# Patient Record
Sex: FEMALE | Race: WHITE | NOT HISPANIC OR LATINO | Employment: OTHER | URBAN - METROPOLITAN AREA
[De-identification: names, ages, dates, MRNs, and addresses within clinical notes are randomized per-mention and may not be internally consistent; named-entity substitution may affect disease eponyms.]

---

## 2017-09-27 ENCOUNTER — ALLSCRIPTS OFFICE VISIT (OUTPATIENT)
Dept: OTHER | Facility: OTHER | Age: 67
End: 2017-09-27

## 2017-09-27 DIAGNOSIS — Z78.0 ASYMPTOMATIC MENOPAUSAL STATE: ICD-10-CM

## 2017-09-27 DIAGNOSIS — Z12.31 ENCOUNTER FOR SCREENING MAMMOGRAM FOR MALIGNANT NEOPLASM OF BREAST: ICD-10-CM

## 2017-10-31 ENCOUNTER — GENERIC CONVERSION - ENCOUNTER (OUTPATIENT)
Dept: OTHER | Facility: OTHER | Age: 67
End: 2017-10-31

## 2017-11-30 ENCOUNTER — HOSPITAL ENCOUNTER (OUTPATIENT)
Dept: BONE DENSITY | Facility: MEDICAL CENTER | Age: 67
Discharge: HOME | End: 2017-11-30
Payer: MEDICARE

## 2017-11-30 DIAGNOSIS — Z12.31 ENCOUNTER FOR SCREENING MAMMOGRAM FOR MALIGNANT NEOPLASM OF BREAST: ICD-10-CM

## 2017-11-30 DIAGNOSIS — Z78.0 ASYMPTOMATIC MENOPAUSAL STATE: ICD-10-CM

## 2017-11-30 PROCEDURE — 77080 DXA BONE DENSITY AXIAL: CPT

## 2017-12-04 ENCOUNTER — GENERIC CONVERSION - ENCOUNTER (OUTPATIENT)
Dept: OTHER | Facility: OTHER | Age: 67
End: 2017-12-04

## 2018-01-12 VITALS
HEIGHT: 58 IN | SYSTOLIC BLOOD PRESSURE: 124 MMHG | DIASTOLIC BLOOD PRESSURE: 78 MMHG | BODY MASS INDEX: 29.68 KG/M2 | WEIGHT: 141.38 LBS

## 2018-01-23 NOTE — MISCELLANEOUS
Message   Recorded as Task   Date: 12/04/2017 10:00 AM, Created By: eSrgio Gallardo   Task Name: Go to Result   Assigned To: [facility]   Regarding Patient: Johnie Plascencia, Status: Active   Comment:    Tamir Ordonez - 04 Dec 2017 10:00 AM     TASK CREATED  Please let Brooke Buchanan know she has osteopenia only  She should maximize her weight-bearing exercise, calcium and vitamin-D requirements  Patient informed  Active Problems    1  Asymptomatic age-related postmenopausal state (V49 81) (Z78 0)   2  Breast cancer screening, high risk patient (V76 11) (Z12 31)   3  Breast self examination education, encounter for (V65 49) (Z71 89)   4  Cervical cancer screening (V76 2) (Z12 4)   5  Encounter for routine gynecological examination (V72 31) (Z01 419)    Current Meds   1  Avalide 150-12 5 MG Oral Tablet (Irbesartan-Hydrochlorothiazide); Therapy: [id number] to (Last Rx:[id number])  Requested for: 95WHI0883 Ordered   2  Calcium TABS; Therapy: (Recorded:58Bly0258) to Recorded   3  Multi-Vitamin Daily Oral Tablet; Therapy: (Recorded:62Tuv1101) to Recorded   4  Simvastatin 40 MG Oral Tablet; Therapy: 30WRN5512 to (Last Rx:08Mar2011)  Requested for: 95NRO3283 Ordered    Allergies    1  No Known Drug Allergies    2  No Known Environmental Allergies   3   No Known Food Allergies    Signatures   Electronically signed by : Bhargav Jon, ; Dec  4 2017 11:45AM EST                       (Author)

## 2018-10-09 ENCOUNTER — OFFICE VISIT (OUTPATIENT)
Dept: OBGYN CLINIC | Facility: CLINIC | Age: 68
End: 2018-10-09
Payer: MEDICARE

## 2018-10-09 ENCOUNTER — TELEPHONE (OUTPATIENT)
Dept: OBGYN CLINIC | Facility: CLINIC | Age: 68
End: 2018-10-09

## 2018-10-09 VITALS
BODY MASS INDEX: 29.52 KG/M2 | SYSTOLIC BLOOD PRESSURE: 140 MMHG | DIASTOLIC BLOOD PRESSURE: 68 MMHG | WEIGHT: 140.6 LBS | HEIGHT: 58 IN

## 2018-10-09 DIAGNOSIS — N81.4 CYSTOCELE WITH UTERINE PROLAPSE: Primary | ICD-10-CM

## 2018-10-09 PROCEDURE — 99214 OFFICE O/P EST MOD 30 MIN: CPT | Performed by: OBSTETRICS & GYNECOLOGY

## 2018-10-09 RX ORDER — SIMVASTATIN 40 MG
1 TABLET ORAL DAILY
COMMUNITY
Start: 2011-03-08

## 2018-10-09 RX ORDER — LOSARTAN POTASSIUM AND HYDROCHLOROTHIAZIDE 25; 100 MG/1; MG/1
1 TABLET ORAL DAILY
COMMUNITY
Start: 2018-03-07 | End: 2019-03-07

## 2018-10-09 NOTE — PROGRESS NOTES
CC:  Possible growth of the vagina    HPI: Cuong Cortez presents for concerned that she may have a growth of the vaginal area  The patient states over the past 2 weeks she has noticed something in the vaginal area along with a sensation of fullness and pressure  She denies any bleeding, discharge, bladder or bowel issues  Past Medical History:  History reviewed  No pertinent past medical history  Past Surgical History:  History reviewed  No pertinent surgical history  Past OB/Gyn History:  Menstrual cycles     ALLERGIES: No Known Allergies    MEDS:   Current Outpatient Prescriptions:     CALCIUM PO    Cholecalciferol (VITAMIN D PO)    losartan-hydrochlorothiazide (HYZAAR) 100-25 MG per tablet    Multiple Vitamin (MULTI-VITAMIN DAILY PO)    simvastatin (ZOCOR) 40 mg tablet    Review of Systems:  Skin: No rashes or discolorations of any concern  RESP: Denies SOB, no cough  CV: Denies chest pain or palpitations  Breasts: Denies masses, pain, skin changes and nipple discharge  GI: Denies abdominal pain, heartburn, nausea, vomiting, changes in bowel habits  : Denies dysuria, frequency, CVA tenderness, incontinence and hematuria  Genitalia: Denies abnormal vaginal discharge, external lesions, rashes, pelvic pain, abnormal bleeding  Acknowledges the presence of  vaginal pressure  Rectal:  Denies pain, bleeding, hemorrhoids,    Physical Exam:  /68 (BP Location: Left arm, Patient Position: Sitting, Cuff Size: Large)   Ht 4' 10" (1 473 m)   Wt 63 8 kg (140 lb 9 6 oz)   BMI 29 39 kg/m²    Gen: The patient was alert and oriented x3, pleasant well-appearing female in no acute distress  Abd:  Soft, nontender, nondistended, no masses or organomegaly  Back:  No CVA tenderness, no tenderness to palpation along spine  Pelvic  Normal appearing external female genitalia, no visible lesions, no rashes  Vagina is free of discharge, normal vaginal epithelium, no abnormal  Lesions   There is evidence of anterior wall prolapse and some bulging posteriorly  With straining the cervix comes down to the hymenal ring  No palpable adnexal masses or tenderness  No anoperineal lesions  Skin:  No concerning lesions  Extremeties: No edema      Assessment & Plan:   1  Uterine prolapse with mild cystocele  I reviewed with Stefany Gonzales the fact that she has uterine prolapse of almost a third-degree along with a small cystocele and the various options available to her  We discussed non intervention, pessary, sacral spinous ligament fixation of the vagina only, vaginal hysterectomy along with sacral spinous ligament fixation  ACOG pamphlets with regard to these options were given to Stefany Gonzales would like to positive these at home and will get back to me with her decision  I have spent 27 minutes with Patient  today in which greater than 50% of this time was spent in counseling/coordination of care regarding Diagnostic results, Prognosis, Risks and benefits of tx options, Intructions for management and Patient and family education

## 2018-10-17 ENCOUNTER — TELEPHONE (OUTPATIENT)
Dept: OBGYN CLINIC | Facility: CLINIC | Age: 68
End: 2018-10-17

## 2018-10-17 NOTE — TELEPHONE ENCOUNTER
Called pt and several possible dates for surgery were given  Pt states she would look at schedules and call back by Monday to make a consult appointment to schedule surgery

## 2018-10-25 ENCOUNTER — OFFICE VISIT (OUTPATIENT)
Dept: OBGYN CLINIC | Facility: CLINIC | Age: 68
End: 2018-10-25
Payer: MEDICARE

## 2018-10-25 ENCOUNTER — PREP FOR PROCEDURE (OUTPATIENT)
Dept: OBGYN CLINIC | Facility: CLINIC | Age: 68
End: 2018-10-25

## 2018-10-25 VITALS — SYSTOLIC BLOOD PRESSURE: 120 MMHG | DIASTOLIC BLOOD PRESSURE: 80 MMHG | WEIGHT: 139 LBS | BODY MASS INDEX: 29.05 KG/M2

## 2018-10-25 DIAGNOSIS — N81.4 UTERINE PROLAPSE: Primary | ICD-10-CM

## 2018-10-25 DIAGNOSIS — N81.4 CYSTOCELE WITH UTERINE PROLAPSE: ICD-10-CM

## 2018-10-25 DIAGNOSIS — Z41.9 SURGERY, ELECTIVE: Primary | ICD-10-CM

## 2018-10-25 PROCEDURE — 1124F ACP DISCUSS-NO DSCNMKR DOCD: CPT | Performed by: PATHOLOGY

## 2018-10-25 PROCEDURE — 99214 OFFICE O/P EST MOD 30 MIN: CPT | Performed by: OBSTETRICS & GYNECOLOGY

## 2018-10-25 NOTE — PROGRESS NOTES
CC:   Uterine prolapse    HPI: Concha Hernandez presents for discussion and scheduling of her uterine prolapse that was diagnosed just about a week ago  The patient presented with what she thought was a growth and turned out to be the cervix protruding around the hymenal ring area  The patient also has a small cystocele associated with this  We discussed vaginal hysterectomy, possible cystocele repair and possible sacral spinous vault fixation if needed after the hysterectomy and vaginal closure  The patient and I reviewed the risks and benefits, pros and cons of the procedure, including alternatives  A pamphlet, going over the procedure was also given to the patient  Marion Bowling is comfortable in proceeding with the procedures as outlined above and she was personally scheduled by myself  Past Medical History:  History reviewed  No pertinent past medical history  Past Surgical History:  History reviewed  No pertinent surgical history  Past OB/Gyn History:  Patient is menopausal   Denies any history of sexually transmitted infection  No history of abnormal pap smears  Her last pap smear was 2016 and normal     ALLERGIES: No Known Allergies    MEDS:   Current Outpatient Prescriptions:     CALCIUM PO    Cholecalciferol (VITAMIN D PO)    losartan-hydrochlorothiazide (HYZAAR) 100-25 MG per tablet    Multiple Vitamin (MULTI-VITAMIN DAILY PO)    simvastatin (ZOCOR) 40 mg tablet    Family History:  History reviewed  No pertinent family history  Social History:  Social History     Social History    Marital status: /Civil Union     Spouse name: N/A    Number of children: N/A    Years of education: N/A     Occupational History    Not on file       Social History Main Topics    Smoking status: Never Smoker    Smokeless tobacco: Never Used    Alcohol use Yes      Comment: occasionally    Drug use: No    Sexual activity: Yes     Partners: Male     Other Topics Concern    Not on file     Social History Narrative    No narrative on file         Review of Systems:  Gen:   Denies fatigue, chills, nausea, vomiting, fever  Skin: No rashes or discolorations of any concern  RESP: Denies SOB, no cough  CV: Denies chest pain or palpitations  Breasts: Denies masses, pain, skin changes and nipple discharge  GI: Denies abdominal pain, heartburn, nausea, vomiting, changes in bowel habits  : Denies dysuria, frequency, CVA tenderness, incontinence and hematuria  Genitalia: Denies abnormal vaginal discharge, external lesions, rashes, pelvic pain, or abnormal bleeding  Positive for the vaginal pressure, and visible protrusion  Rectal:  Denies pain, bleeding, hemorrhoids,    Physical Exam:  /80 (BP Location: Left arm, Patient Position: Sitting, Cuff Size: Standard)   Wt 63 kg (139 lb)   BMI 29 05 kg/m²    Gen: The patient was alert and oriented x3, pleasant well-appearing female in no acute distress  Neck:  Unremarkable, no anterior or posterior lymphadenopathy, no thyromegaly  CV:  RRR, no murmurs  Resp:  Clear to auscultation bilaterally, no wheezing  Abd:  Soft, nontender, nondistended, no masses or organomegaly  Back:  No CVA tenderness, no tenderness to palpation along spine  Pelvic  Normal appearing external female genitalia, no visible lesions, no rashes  Upon vaginal exam a normal appearing cervix is noted just near the hymenal ring area  There is also a small cystocele noted anteriorly and the posterior vaginal wall is firm with no abnormalities  Bimanual exam shows an atrophic uterus and cervix the with the uterus being hypermobile  No overt adnexal masses or tenderness were noted  No anoperineal lesions  Rectal:  No masses, tenderness, hemorrhoids, or obvious blood  Skin:  No concerning lesions or rashes  Extremeties: No edema or deformities      Assessment & Plan:   1  Uterine prolapse with small cystocele  Plan is for vaginal hysterectomy    If necessary a cystocele and/or sacral spinous ligament fixation will be performed if deemed necessary

## 2018-11-20 ENCOUNTER — APPOINTMENT (OUTPATIENT)
Dept: LAB | Facility: HOSPITAL | Age: 68
End: 2018-11-20
Attending: OBSTETRICS & GYNECOLOGY
Payer: MEDICARE

## 2018-11-20 ENCOUNTER — HOSPITAL ENCOUNTER (OUTPATIENT)
Dept: NON INVASIVE DIAGNOSTICS | Facility: HOSPITAL | Age: 68
Discharge: HOME | End: 2018-11-20
Attending: OBSTETRICS & GYNECOLOGY
Payer: MEDICARE

## 2018-11-20 DIAGNOSIS — Z41.9 SURGERY, ELECTIVE: ICD-10-CM

## 2018-11-20 LAB
ABO GROUP BLD: NORMAL
ATRIAL RATE: 68 BPM
BLD GP AB SCN SERPL QL: NEGATIVE
HCT VFR BLD AUTO: 45.6 % (ref 34.8–46.1)
HGB BLD-MCNC: 14.6 G/DL (ref 11.5–15.4)
P AXIS: 36 DEGREES
PR INTERVAL: 152 MS
QRS AXIS: -18 DEGREES
QRSD INTERVAL: 82 MS
QT INTERVAL: 404 MS
QTC INTERVAL: 429 MS
RH BLD: POSITIVE
SPECIMEN EXPIRATION DATE: NORMAL
T WAVE AXIS: 22 DEGREES
VENTRICULAR RATE: 68 BPM

## 2018-11-20 PROCEDURE — 85018 HEMOGLOBIN: CPT

## 2018-11-20 PROCEDURE — 36415 COLL VENOUS BLD VENIPUNCTURE: CPT

## 2018-11-20 PROCEDURE — 86900 BLOOD TYPING SEROLOGIC ABO: CPT

## 2018-11-20 PROCEDURE — 86850 RBC ANTIBODY SCREEN: CPT

## 2018-11-20 PROCEDURE — 93042 RHYTHM ECG REPORT: CPT | Performed by: INTERNAL MEDICINE

## 2018-11-20 PROCEDURE — 93041 RHYTHM ECG TRACING: CPT

## 2018-11-20 PROCEDURE — 86901 BLOOD TYPING SEROLOGIC RH(D): CPT

## 2018-11-20 PROCEDURE — 85014 HEMATOCRIT: CPT

## 2018-11-20 NOTE — PRE-PROCEDURE INSTRUCTIONS
Pre-Surgery Instructions:   Medication Instructions    CALCIUM PO Instructed patient per Anesthesia Guidelines   Cholecalciferol (VITAMIN D PO) Instructed patient per Anesthesia Guidelines   losartan-hydrochlorothiazide (HYZAAR) 100-25 MG per tablet Instructed patient per Anesthesia Guidelines   Multiple Vitamin (MULTI-VITAMIN DAILY PO) Instructed patient per Anesthesia Guidelines   simvastatin (ZOCOR) 40 mg tablet Instructed patient per Anesthesia Guidelines    Patient given/ instructed on use of chlorhexidine soap per hospital protocol    Patient instructed to stop all ASA, NSAIDS, vitamins and herbal supplements one week prior to surgery or per Dr Man Section

## 2018-11-23 ENCOUNTER — ANESTHESIA EVENT (OUTPATIENT)
Dept: PERIOP | Facility: HOSPITAL | Age: 68
End: 2018-11-23
Payer: MEDICARE

## 2018-11-26 DIAGNOSIS — Z41.9 SURGERY, ELECTIVE: Primary | ICD-10-CM

## 2018-11-27 ENCOUNTER — HOSPITAL ENCOUNTER (OUTPATIENT)
Facility: HOSPITAL | Age: 68
Setting detail: OUTPATIENT SURGERY
Discharge: HOME | End: 2018-11-28
Attending: OBSTETRICS & GYNECOLOGY | Admitting: OBSTETRICS & GYNECOLOGY
Payer: MEDICARE

## 2018-11-27 ENCOUNTER — ANESTHESIA (OUTPATIENT)
Dept: PERIOP | Facility: HOSPITAL | Age: 68
End: 2018-11-27
Payer: MEDICARE

## 2018-11-27 DIAGNOSIS — G89.18 POST-OPERATIVE PAIN: Primary | ICD-10-CM

## 2018-11-27 DIAGNOSIS — N81.4 UTERINE PROLAPSE: ICD-10-CM

## 2018-11-27 DIAGNOSIS — N81.4 CYSTOCELE WITH UTERINE PROLAPSE: ICD-10-CM

## 2018-11-27 LAB
ABO GROUP BLD: NORMAL
BLD GP AB SCN SERPL QL: NEGATIVE
GLUCOSE SERPL-MCNC: 92 MG/DL (ref 65–140)
RH BLD: POSITIVE
SPECIMEN EXPIRATION DATE: NORMAL

## 2018-11-27 PROCEDURE — 88305 TISSUE EXAM BY PATHOLOGIST: CPT | Performed by: PATHOLOGY

## 2018-11-27 PROCEDURE — 86850 RBC ANTIBODY SCREEN: CPT | Performed by: OBSTETRICS & GYNECOLOGY

## 2018-11-27 PROCEDURE — 58260 VAGINAL HYSTERECTOMY: CPT | Performed by: OBSTETRICS & GYNECOLOGY

## 2018-11-27 PROCEDURE — 86900 BLOOD TYPING SEROLOGIC ABO: CPT | Performed by: OBSTETRICS & GYNECOLOGY

## 2018-11-27 PROCEDURE — 86901 BLOOD TYPING SEROLOGIC RH(D): CPT | Performed by: OBSTETRICS & GYNECOLOGY

## 2018-11-27 PROCEDURE — 82948 REAGENT STRIP/BLOOD GLUCOSE: CPT

## 2018-11-27 PROCEDURE — 57282 COLPOPEXY EXTRAPERITONEAL: CPT | Performed by: OBSTETRICS & GYNECOLOGY

## 2018-11-27 RX ORDER — ONDANSETRON 2 MG/ML
INJECTION INTRAMUSCULAR; INTRAVENOUS AS NEEDED
Status: DISCONTINUED | OUTPATIENT
Start: 2018-11-27 | End: 2018-11-27 | Stop reason: SURG

## 2018-11-27 RX ORDER — SODIUM CHLORIDE 9 MG/ML
125 INJECTION, SOLUTION INTRAVENOUS CONTINUOUS
Status: DISCONTINUED | OUTPATIENT
Start: 2018-11-27 | End: 2018-11-28

## 2018-11-27 RX ORDER — DOCUSATE SODIUM 100 MG/1
100 CAPSULE, LIQUID FILLED ORAL 2 TIMES DAILY
Status: DISCONTINUED | OUTPATIENT
Start: 2018-11-27 | End: 2018-11-28 | Stop reason: HOSPADM

## 2018-11-27 RX ORDER — OXYCODONE HYDROCHLORIDE AND ACETAMINOPHEN 5; 325 MG/1; MG/1
1 TABLET ORAL EVERY 4 HOURS PRN
Status: DISCONTINUED | OUTPATIENT
Start: 2018-11-27 | End: 2018-11-28 | Stop reason: HOSPADM

## 2018-11-27 RX ORDER — DOCUSATE SODIUM 100 MG/1
100 CAPSULE, LIQUID FILLED ORAL 2 TIMES DAILY PRN
COMMUNITY

## 2018-11-27 RX ORDER — HYDROMORPHONE HCL/PF 1 MG/ML
SYRINGE (ML) INJECTION AS NEEDED
Status: DISCONTINUED | OUTPATIENT
Start: 2018-11-27 | End: 2018-11-27 | Stop reason: SURG

## 2018-11-27 RX ORDER — CEFAZOLIN SODIUM 1 G/50ML
1000 SOLUTION INTRAVENOUS ONCE
Status: COMPLETED | OUTPATIENT
Start: 2018-11-27 | End: 2018-11-27

## 2018-11-27 RX ORDER — IBUPROFEN 600 MG/1
600 TABLET ORAL EVERY 6 HOURS PRN
Status: DISCONTINUED | OUTPATIENT
Start: 2018-11-27 | End: 2018-11-28 | Stop reason: HOSPADM

## 2018-11-27 RX ORDER — ONDANSETRON 2 MG/ML
4 INJECTION INTRAMUSCULAR; INTRAVENOUS EVERY 6 HOURS PRN
Status: DISCONTINUED | OUTPATIENT
Start: 2018-11-27 | End: 2018-11-28 | Stop reason: HOSPADM

## 2018-11-27 RX ORDER — FENTANYL CITRATE/PF 50 MCG/ML
25 SYRINGE (ML) INJECTION
Status: COMPLETED | OUTPATIENT
Start: 2018-11-27 | End: 2018-11-27

## 2018-11-27 RX ORDER — ROCURONIUM BROMIDE 10 MG/ML
INJECTION, SOLUTION INTRAVENOUS AS NEEDED
Status: DISCONTINUED | OUTPATIENT
Start: 2018-11-27 | End: 2018-11-27 | Stop reason: SURG

## 2018-11-27 RX ORDER — PANTOPRAZOLE SODIUM 20 MG/1
20 TABLET, DELAYED RELEASE ORAL
Status: DISCONTINUED | OUTPATIENT
Start: 2018-11-27 | End: 2018-11-28 | Stop reason: HOSPADM

## 2018-11-27 RX ORDER — DEXTROSE, SODIUM CHLORIDE, AND POTASSIUM CHLORIDE 5; .45; .15 G/100ML; G/100ML; G/100ML
125 INJECTION INTRAVENOUS CONTINUOUS
Status: DISCONTINUED | OUTPATIENT
Start: 2018-11-27 | End: 2018-11-28

## 2018-11-27 RX ORDER — GLYCOPYRROLATE 0.2 MG/ML
INJECTION INTRAMUSCULAR; INTRAVENOUS AS NEEDED
Status: DISCONTINUED | OUTPATIENT
Start: 2018-11-27 | End: 2018-11-27 | Stop reason: SURG

## 2018-11-27 RX ORDER — OXYCODONE HYDROCHLORIDE 5 MG/1
10 TABLET ORAL EVERY 4 HOURS PRN
Status: DISCONTINUED | OUTPATIENT
Start: 2018-11-27 | End: 2018-11-28

## 2018-11-27 RX ORDER — FENTANYL CITRATE 50 UG/ML
INJECTION, SOLUTION INTRAMUSCULAR; INTRAVENOUS AS NEEDED
Status: DISCONTINUED | OUTPATIENT
Start: 2018-11-27 | End: 2018-11-27 | Stop reason: SURG

## 2018-11-27 RX ORDER — DEXAMETHASONE SODIUM PHOSPHATE 4 MG/ML
INJECTION, SOLUTION INTRA-ARTICULAR; INTRALESIONAL; INTRAMUSCULAR; INTRAVENOUS; SOFT TISSUE AS NEEDED
Status: DISCONTINUED | OUTPATIENT
Start: 2018-11-27 | End: 2018-11-27 | Stop reason: SURG

## 2018-11-27 RX ORDER — HYDROMORPHONE HCL/PF 1 MG/ML
0.5 SYRINGE (ML) INJECTION
Status: DISCONTINUED | OUTPATIENT
Start: 2018-11-27 | End: 2018-11-28 | Stop reason: HOSPADM

## 2018-11-27 RX ORDER — ONDANSETRON 2 MG/ML
4 INJECTION INTRAMUSCULAR; INTRAVENOUS ONCE AS NEEDED
Status: DISCONTINUED | OUTPATIENT
Start: 2018-11-27 | End: 2018-11-27 | Stop reason: HOSPADM

## 2018-11-27 RX ORDER — MAGNESIUM HYDROXIDE 1200 MG/15ML
LIQUID ORAL AS NEEDED
Status: DISCONTINUED | OUTPATIENT
Start: 2018-11-27 | End: 2018-11-27 | Stop reason: HOSPADM

## 2018-11-27 RX ORDER — MIDAZOLAM HYDROCHLORIDE 1 MG/ML
INJECTION INTRAMUSCULAR; INTRAVENOUS AS NEEDED
Status: DISCONTINUED | OUTPATIENT
Start: 2018-11-27 | End: 2018-11-27 | Stop reason: SURG

## 2018-11-27 RX ORDER — PROPOFOL 10 MG/ML
INJECTION, EMULSION INTRAVENOUS AS NEEDED
Status: DISCONTINUED | OUTPATIENT
Start: 2018-11-27 | End: 2018-11-27 | Stop reason: SURG

## 2018-11-27 RX ADMIN — FENTANYL CITRATE 50 MCG: 50 INJECTION, SOLUTION INTRAMUSCULAR; INTRAVENOUS at 09:57

## 2018-11-27 RX ADMIN — ROCURONIUM BROMIDE 10 MG: 10 INJECTION INTRAVENOUS at 10:52

## 2018-11-27 RX ADMIN — LIDOCAINE HYDROCHLORIDE 60 MG: 20 INJECTION, SOLUTION INTRAVENOUS at 09:38

## 2018-11-27 RX ADMIN — OXYCODONE HYDROCHLORIDE AND ACETAMINOPHEN 1 TABLET: 5; 325 TABLET ORAL at 23:26

## 2018-11-27 RX ADMIN — SODIUM CHLORIDE 125 ML/HR: 0.9 INJECTION, SOLUTION INTRAVENOUS at 09:30

## 2018-11-27 RX ADMIN — ONDANSETRON HYDROCHLORIDE 4 MG: 2 INJECTION, SOLUTION INTRAVENOUS at 15:41

## 2018-11-27 RX ADMIN — OXYCODONE HYDROCHLORIDE 10 MG: 5 TABLET ORAL at 17:43

## 2018-11-27 RX ADMIN — MIDAZOLAM 2 MG: 1 INJECTION INTRAMUSCULAR; INTRAVENOUS at 09:35

## 2018-11-27 RX ADMIN — FENTANYL CITRATE 25 MCG: 50 INJECTION INTRAMUSCULAR; INTRAVENOUS at 12:43

## 2018-11-27 RX ADMIN — HYDROMORPHONE HYDROCHLORIDE 0.5 MG: 1 INJECTION, SOLUTION INTRAMUSCULAR; INTRAVENOUS; SUBCUTANEOUS at 14:40

## 2018-11-27 RX ADMIN — CEFAZOLIN SODIUM 1000 MG: 1 SOLUTION INTRAVENOUS at 09:40

## 2018-11-27 RX ADMIN — FENTANYL CITRATE 25 MCG: 50 INJECTION INTRAMUSCULAR; INTRAVENOUS at 13:18

## 2018-11-27 RX ADMIN — NEOSTIGMINE METHYLSULFATE 3 MG: 1 INJECTION, SOLUTION INTRAMUSCULAR; INTRAVENOUS; SUBCUTANEOUS at 11:43

## 2018-11-27 RX ADMIN — FENTANYL CITRATE 100 MCG: 50 INJECTION, SOLUTION INTRAMUSCULAR; INTRAVENOUS at 09:38

## 2018-11-27 RX ADMIN — DOCUSATE SODIUM 100 MG: 100 CAPSULE, LIQUID FILLED ORAL at 17:38

## 2018-11-27 RX ADMIN — ONDANSETRON HYDROCHLORIDE 4 MG: 2 INJECTION, SOLUTION INTRAVENOUS at 11:14

## 2018-11-27 RX ADMIN — FENTANYL CITRATE 25 MCG: 50 INJECTION INTRAMUSCULAR; INTRAVENOUS at 13:08

## 2018-11-27 RX ADMIN — HYDROMORPHONE HYDROCHLORIDE 0.5 MG: 1 INJECTION, SOLUTION INTRAMUSCULAR; INTRAVENOUS; SUBCUTANEOUS at 11:03

## 2018-11-27 RX ADMIN — DEXAMETHASONE SODIUM PHOSPHATE 4 MG: 4 INJECTION, SOLUTION INTRAMUSCULAR; INTRAVENOUS at 09:52

## 2018-11-27 RX ADMIN — GLYCOPYRROLATE 0.6 MG: 0.2 INJECTION, SOLUTION INTRAMUSCULAR; INTRAVENOUS at 11:43

## 2018-11-27 RX ADMIN — FENTANYL CITRATE 50 MCG: 50 INJECTION, SOLUTION INTRAMUSCULAR; INTRAVENOUS at 09:53

## 2018-11-27 RX ADMIN — ROCURONIUM BROMIDE 50 MG: 10 INJECTION INTRAVENOUS at 09:38

## 2018-11-27 RX ADMIN — PROPOFOL 150 MG: 10 INJECTION, EMULSION INTRAVENOUS at 09:38

## 2018-11-27 RX ADMIN — DEXTROSE, SODIUM CHLORIDE, AND POTASSIUM CHLORIDE 125 ML/HR: 5; .45; .15 INJECTION INTRAVENOUS at 17:38

## 2018-11-27 RX ADMIN — FENTANYL CITRATE 25 MCG: 50 INJECTION INTRAMUSCULAR; INTRAVENOUS at 12:38

## 2018-11-27 NOTE — ANESTHESIA PREPROCEDURE EVALUATION
Review of Systems/Medical History  Patient summary reviewed  Chart reviewed  No history of anesthetic complications     Cardiovascular  Hyperlipidemia, Hypertension controlled,    Pulmonary  Negative pulmonary ROS        GI/Hepatic    Liver disease , Hepatitis ,        Negative  ROS        Endo/Other  Negative endo/other ROS      GYN       Hematology  Negative hematology ROS      Musculoskeletal    Arthritis     Neurology  Negative neurology ROS      Psychology   Negative psychology ROS              Physical Exam    Airway    Mallampati score: II  TM Distance: >3 FB  Neck ROM: full     Dental   No notable dental hx     Cardiovascular  Rhythm: regular, Rate: normal, Cardiovascular exam normal    Pulmonary  Pulmonary exam normal Breath sounds clear to auscultation,     Other Findings        Anesthesia Plan  ASA Score- 2     Anesthesia Type- general with ASA Monitors  Additional Monitors:   Airway Plan: ETT  Plan Factors-Patient not instructed to abstain from smoking on day of procedure  Patient did not smoke on day of surgery  Induction- intravenous  Postoperative Plan- Plan for postoperative opioid use  Informed Consent- Anesthetic plan and risks discussed with patient

## 2018-11-27 NOTE — DISCHARGE INSTRUCTIONS
Post-Gynecologic Surgery Discharge Instructions:  1  No heavy lifting more than one full gallon of milk (about 8 lbs) for 1 week  2  Nothing in the vagina for 6 weeks  3  You may take stairs one at a time, touching each step with both feet for the first few days, then as tolerated  4  Call the office for fever greater than 100  4'F, heavy vaginal bleeding, or increasing pain  5  Activity as tolerated  6  Avoid driving if taking narcotic pain medications (Percocet)  Post Operative Pain Management:  If you have cramping or mild pain you may take 600 mg Ibuprofen every 6 hours to relieve  If you continue to have residual mild pain not entirely relieved by Ibuprofen then you may take 650 mg of tylenol every 6 hours  If you have moderate pain then please take 1 tab of Percocet every 4 hours for relief  Do not combine with tylenol and please wait at least 4 hours after your last dose of Tylenol  If you have severe pain then please take 2 tabs of Percocet every 6 hours for relief  Do not combine with tylenol and please wait at least 4 hours after your last dose of Tylenol  If you have any questions regarding your prescriptions please call your doctor  Vaginal Hysterectomy   WHAT YOU SHOULD KNOW:   A vaginal hysterectomy is surgery to remove your uterus through your vagina  Other organs, such as your ovaries and fallopian tubes, may also be removed  AFTER YOU LEAVE:   Medicines:   · Anticoagulants    are a type of blood thinner medicine that helps prevent clots  Clots can cause strokes, heart attacks, and death  These medicines may cause you to bleed or bruise more easily  ¨ Watch for bleeding from your gums or nose  Watch for blood in your urine and bowel movements  Use a soft washcloth and a soft toothbrush  If you shave, use an electric razor  Avoid activities that can cause bruising or bleeding      ¨ Tell your healthcare provider about all medicines you take because many medicines cannot be used with anticoagulants  Do not start or stop any medicines unless your healthcare provider tells you to  Tell your dentist and other healthcare providers that you take anticoagulants  Wear a bracelet or necklace that says you take this medicine  ¨ You will need regular blood tests so your healthcare provider can decide how much medicine you need  Take anticoagulants exactly as directed  Tell your healthcare provider right away if you forget to take the medicine, or if you take too much  ¨ If you take warfarin, some foods can change how your blood clots  Do not make major changes to your diet while you take warfarin  Warfarin works best when you eat about the same amount of vitamin K every day  Vitamin K is found in green leafy vegetables, broccoli, grapes, and other foods  Ask for more information about what to eat when you take warfarin  · Prescription pain medicine  may be given  Ask your PHP how to take this medicine safely  · Antibiotics  help treat or prevent a bacterial infection  · Take your medicine as directed  Contact your PHP if you think your medicine is not helping or if you have side effects  Tell him if you are allergic to any medicine  Keep a list of the medicines, vitamins, and herbs you take  Include the amounts, and when and why you take them  Bring the list or the pill bottles to follow-up visits  Carry your medicine list with you in case of an emergency  Follow up with your PHP or gynecologist as directed: You may need to return for blood tests, ultrasound, CT scan, or other tests  Write down your questions so you remember to ask them during your visits  Rest as needed: You may feel like resting more after surgery  Slowly start to do more each day  Ask when you can return to your usual activities  Contact your PHP or gynecologist if:   · You have heavy vaginal bleeding that fills 1 or more sanitary pads in 1 hour  · You have a fever      · You feel pain when you urinate, or you have trouble urinating  · You feel pain during sex  · You feel pain or fullness in your vagina  · You feel like something is sticking out of your vagina  · You have questions or concerns about your condition or care  Seek care immediately or call 911 if:   · Your arm or leg feels warm, tender, and painful  It may look swollen and red  · You feel lightheaded, short of breath, and have chest pain  · You cough up blood  © 2014 [address] is for End User's use only and may not be sold, redistributed or otherwise used for commercial purposes  All illustrations and images included in CareNotes® are the copyrighted property of [employer] D [employer]  or Anish Garcia  The above information is an  only  It is not intended as medical advice for individual conditions or treatments  Talk to your doctor, nurse or pharmacist before following any medical regimen to see if it is safe and effective for you  Cystoscopy   WHAT YOU NEED TO KNOW:   A cystoscopy is a procedure to look inside of your urethra and bladder using a cystoscope  A cystoscope is a small tube with a light and magnifying camera on the end  The procedure is used to diagnose and treat conditions of the bladder, urethra, and prostate  The procedure is also done to remove stones or blood clots from the urethra or bladder  Your healthcare provider may do other tests, such as ureteroscopy, during a cystoscopy  DISCHARGE INSTRUCTIONS:   Call 911 if:   · You suddenly have chest pain or trouble breathing  Seek care immediately if:   · Your urine turns from pink to red, or you have clots in your urine  · You cannot urinate and your bladder feels full  · Your pain or burning becomes worse or lasts longer than 2 days  Contact your healthcare provider or urologist if:   · Your urine stays pink for longer than 3 days      · You urinate less than normal, or still feel like you have to urinate after you use the bathroom  · Your skin is itchy, swollen, or has a new rash  · You have a fever and chills  · You have questions or concerns about your condition or care  Medicines: You may  be given any of the following:  · Antibiotics  help treat or prevent a bacterial infection  · Acetaminophen  decreases pain and fever  It is available without a doctor's order  Ask how much to take and how often to take it  Follow directions  Read the labels of all other medicines you are using to see if they also contain acetaminophen, or ask your doctor or pharmacist  Acetaminophen can cause liver damage if not taken correctly  Do not use more than 4 grams (4,000 milligrams) total of acetaminophen in one day  · Take your medicine as directed  Contact your healthcare provider if you think your medicine is not helping or if you have side effects  Tell him or her if you are allergic to any medicine  Keep a list of the medicines, vitamins, and herbs you take  Include the amounts, and when and why you take them  Bring the list or the pill bottles to follow-up visits  Carry your medicine list with you in case of an emergency  Follow up with your healthcare provider as directed: You may need to have another cystoscopy  Write down your questions so you remember to ask them during your visits  Self-care:   · Drink at least 3 to 4 glasses of water daily for 2 days after your procedure  Do not drink acidic juices such as orange juice and lemonade  Drink water to help prevent blood clots from forming  It can also help decrease the amount of acid in your urine  Acid in your urine may increase the burning feeling when you urinate  · Sit in a warm tub of water  Warm water may relieve pain and bladder spasms  · Do not have sex  until your healthcare provider tells you it is okay  Sex may increase your risk for a urinary tract infection    © 2017 [facility] Information is for End User's use only and may not be sold, redistributed or otherwise used for commercial purposes  All illustrations and images included in CareNotes® are the copyrighted property of [employer] M [employer] Inc  or Anish Garcia  The above information is an  only  It is not intended as medical advice for individual conditions or treatments  Talk to your doctor, nurse or pharmacist before following any medical regimen to see if it is safe and effective for you

## 2018-11-27 NOTE — OP NOTE
OPERATIVE REPORT  PATIENT NAME: Carrie Humphrey    :  1950  MRN: [id number]  Pt Location: [facility] OR ROOM 04    SURGERY DATE: 2018    Surgeon(s) and Role:     * Frankey Rhymes, MD - Primary     * Cira Roberts MD - Assisting    Preop Diagnosis:  Uterine prolapse [N81 4]  Cystocele with uterine prolapse [N81 4]    Post-Op Diagnosis Codes:     * Uterine prolapse [N81 4]     * vaginal prolapse    Procedure(s) (LRB):  TOTAL VAG  HYSTERECTOMY (N/A)  FIXATION LIGAMENT SACROSPINOUS; COLPORRAPHY (N/A)  CYSTO (N/A)    Specimen(s):  ID Type Source Tests Collected by Time Destination   1 : uterus and cervix Tissue Uterus TISSUE EXAM Frankey Rhymes, MD 2018 [id number]        Estimated Blood Loss: 150cc      Drains:  Urethral Catheter Non-latex 16 Fr  (Active)   Site Assessment Clean;Skin intact 2018 12:08 PM   Collection Container Standard drainage bag 2018 12:08 PM   Securement Method Securing device (Describe) 2018 12:08 PM   Number of days: 0       Anesthesia Type:   General    Operative Indications:  Uterine prolapse [N81 4]  Cystocele with uterine prolapse [N81 4]      Operative Findings:   Newly completed vaginal cuff following a total vaginal hysterectomy that now rested approximately 2/3 of the way down in the vaginal canal       Complications:   None    Procedure and Technique: Following the completed vaginal hysterectomy, the vaginal cuff was easily to thirds down in the vaginal vault area  After careful examination of the vaginal area for an appropriate place to perform the sacral spinous fixation, the decision was to proceed to provide additional vaginal support  Two Allis clamps were placed on the vaginal cuff just inside the hymenal ring at the 5 and 7 o'clock area  A midline incision was made from the area just inside the hymenal ring cephalic towards the vaginal cuff area    This was initially made by Bovie cautery and then carried through to completion by Metzenbaum scissors dissection  The rectovaginal space was easily dissected with the 's index finger  Further advancement through the rectovaginal septum was performed and then the loose areolar tissue around the patient's right ischial spine of was easily swept away  An area of the vaginal cuff was already pre marked with an Allis clamp and the Capio ligature carrier device was made ready within the absorbable suture  Taking care to place to the vice at least a finger breath medial from the ischial spine, as well as placement into the belly of the coccygeal muscle, the suture was fired and brought out through the vaginal incision  Good placement was noted by tugging on the sutures which moved the patient  A digital gloved exam of the rectum showed no injury to the underlying rectal structure  Both ends of the suture were then brought out through the full thickness of the vaginal approximately 1cm and half below the newly tied vaginal cuff  Both sutures were then clamped with a hemostat for future use  The vertical midline incision of the vagina was now closed using running interlocking 0 Vicryl suture and brought to completion approximately 2 cm from the hymenal ring area  The sacral spinous suture was now tied down and carefully bringing the vaginal cuff into good opposition with the right sacral spinous/coccygeal is complex area  No tension in this area was noted  The remainder of the midline vaginal incision was reapproximated with the suture that was already being used  At this point the patient was now ready for cystoscopy which can be found in the resident's report     I was present for the entire procedure    Patient Disposition:  PACU     SIGNATURE: Rio Muñoz MD  DATE: November 27, 2018  TIME: 1:22 PM

## 2018-11-27 NOTE — H&P
H&P Note - Gynecology   Marquis Garcia 70 y o  female MRN: [id number]  Unit/Bed#: OR [place] Encounter: [id number]      ASSESSMENT:  70 y o  yo female with pelvic organ prolapse    PLAN:  total vaginal hysterectomy  possible cystocele repair  possible sacral spinous vault fixation  Patient of Jude Pereyra Azul:    Please see the note on 10/25/18 for a detailed HPI  RoS:  Pain: 0/10  Tolerating Oral Intake: is tolerating PO liquids and solids  Voiding: yes - spontaneously  Flatus: yes  Bowel Movement: yes  Ambulating: yes  Chest Pain: no  Shortness of Breath: no  Leg Pain/Discomfort: no        OBJECTIVE    Historical Information     Past Medical History:   Diagnosis Date    Arthritis     Hyperlipidemia     Hypertension     Infectious viral hepatitis     pt reports" tested positive for Hep C but doesn't have it"    Prolapsed uterus     Wears glasses        Past Surgical History:   Procedure Laterality Date    HYSTEROSCOPY W/ POLYPECTOMY      "years ago Dr Rafi Nunez removed some polyps"    REPAIR RECTOCELE      TUBAL LIGATION         Obstetric History       T0      L0     SAB0   TAB0   Ectopic0   Multiple0   Live Births0       # Outcome Date GA Lbr Abhi/2nd Weight Sex Delivery Anes PTL Lv   3 Para            2 Para            1 Para                   History reviewed  No pertinent family history  Social History     Social History    Marital status: /Civil Union     Spouse name: N/A    Number of children: N/A    Years of education: N/A     Occupational History    Not on file       Social History Main Topics    Smoking status: Never Smoker    Smokeless tobacco: Never Used    Alcohol use Yes      Comment: occasionally    Drug use: No    Sexual activity: Not on file     Other Topics Concern    Not on file     Social History Narrative    No narrative on file       History   Alcohol Use    Yes     Comment: occasionally     History   Drug Use No     History Smoking Status    Never Smoker   Smokeless Tobacco    Never Used       Prescriptions Prior to Admission   Medication    CALCIUM PO    Cholecalciferol (VITAMIN D PO)    docusate sodium (COLACE) 100 mg capsule    losartan-hydrochlorothiazide (HYZAAR) 100-25 MG per tablet    Multiple Vitamin (MULTI-VITAMIN DAILY PO)    simvastatin (ZOCOR) 40 mg tablet     No current facility-administered medications on file prior to encounter  Current Outpatient Prescriptions on File Prior to Encounter   Medication Sig Dispense Refill    CALCIUM PO Take 1,000 mg by mouth daily        Cholecalciferol (VITAMIN D PO) Take 50 mcg by mouth daily        losartan-hydrochlorothiazide (HYZAAR) 100-25 MG per tablet Take 1 tablet by mouth daily        Multiple Vitamin (MULTI-VITAMIN DAILY PO) Take 2 tablets by mouth daily        simvastatin (ZOCOR) 40 mg tablet Take 1 tablet by mouth daily         No Known Allergies    Patient Vitals for the past 24 hrs:   BP Temp Temp src Pulse Resp SpO2 Height Weight   11/27/18 0757 148/89 (!) 97 2 °F (36 2 °C) Tympanic 85 18 98 % 4' 10" (1 473 m) 63 kg (139 lb)     Oxygen Therapy  SpO2: 98 %  I/O     None        No intake or output data in the 24 hours ending 11/27/18 0842      Physical exam:   General: No Acute Distress   Cardiovascular: Regular Rate and Rhythm   Lungs: Clear to Auscultation Bilaterally, no wheezing, rhonchi or rales   Abdomen:     Non-tender,     No rebound or guarding;      Bowel sounds: regular   Lower Extremities: negative Feli's sign bilaterally   Neuro: Alert, cooperative      Laboratory Studies:      Results from last 7 days  Lab Units 11/20/18  0948   HEMOGLOBIN g/dL 14 6               ABO Grouping   Date Value Ref Range Status   11/20/2018 O  Final     Rh Factor   Date Value Ref Range Status   11/20/2018 Positive  Final     No results found for: ANTIBODYSCR  Specimen Expiration Date   Date Value Ref Range Status   11/20/2018 [id number]  Final Medications:      Continuous Infusions:    sodium chloride 125 mL/hr       Scheduled Meds:    Current Facility-Administered Medications:  sodium chloride 125 mL/hr Intravenous Continuous Sintia Haines MD       PRN Meds:      Invasive  Devices:   Invasive Devices          No matching active lines, drains, or airways          Additional Vitals:    Temp  Min: 97 2 °F (36 2 °C)  Max: 97 2 °F (36 2 °C)    IBW: 40 9 kg            Invasive/non-invasive ventilation settings:  Respiratory    Lab Data (Last 4 hours)    None         O2/Vent Data (Last 4 hours)    None              Code Status: No Order  Advance Directive and Living Will:      Power of :    POLST:

## 2018-11-27 NOTE — OP NOTE
OPERATIVE REPORT  PATIENT NAME: Jayshree Baxter    :  1950  MRN: [id number]  Pt Location: [facility] OR ROOM 04    SURGERY DATE: 2018    Surgeon(s) and Role:     * Tesfaye Hill MD - Primary     * Angel Green MD - Assisting    Preop Diagnosis:  Uterine prolapse [N81 4]  Cystocele with uterine prolapse [N81 4]    Post-Op Diagnosis Codes:     * Uterine prolapse [N81 4]     * Cystocele with uterine prolapse [N81 4]    Procedure(s) (LRB):  TOTAL VAG  HYSTERECTOMY (N/A)  FIXATION LIGAMENT SACROSPINOUS; COLPORRAPHY (N/A)  CYSTO (N/A)    Specimen(s):  ID Type Source Tests Collected by Time Destination   1 : uterus and cervix Tissue Uterus TISSUE EXAM Tesfaye Hill MD 2018 1051          Drains:  Urethral Catheter Non-latex 16 Fr  (Active)   Site Assessment Clean;Skin intact 2018 12:08 PM   Collection Container Standard drainage bag 2018 12:08 PM   Securement Method Securing device (Describe) 2018 12:08 PM   Number of days: 0       Anesthesia Type:   General    Operative Indications:  Uterine prolapse [N81 4]  Cystocele with uterine prolapse [N81 4]    Operative Findings:    Estimated Blood Loss: 150 mL    Pelvic Exam Findings:   Anteverted uterus   Mobile uterus   7 weeks in size   Descensus is favorable as candidate for vaginal hysterectomy   Vaginal outlet is favorable as for vaginal hysterectomy   No palpable L adnexal mass or fullness  No palpable R adnexal mass or fullness  No evidence of posterior compartment prolapse      Cystoscopy Findings:   No urethral lesions   No lesions or injury of bladder neck   No lesions or injury on trigone   No lesions or injury on dome   No masses   No mesh visualized in bladder lumen  No suture material visualized in bladder lumen     Successful visualization of left ureter jet effluxing urine   Successful visualization of right ureter jet effluxing urine   Absence of Hunner's ulcers   Absence of intravesicular trabeculations   Absence of hyperemic urothelium   Absence of petechial hemorrhages      Complications:   None    Procedure and Technique: In the pre-operative holding area, the indications and planned procedure were reviewed with the patient and she indicated she would like to proceed  The patient was taken to the operating room and positioned on the table supine  Sequential compression devices were placed on the bilateral lower extremities  General endotracheal anesthesia was administered  The patient's arms were resting in a gently abducted position of <90' on arm boards  All pressure points were padded  A forced air normothermia system was placed to maintain control of core body temperature  A soft cushion was placed over her face  She was placed in the dorsal lithotomy position, with the buttocks extending slightly over the table, with the hips, knees and ankles always in less than 90 degrees flexion  A bimanual exam was performed  Her abdomen was prepped with a Chloraprep applicator  The perineum and vagina were prepped with Chlorhexidine solution  Preoperative intravenous antimicrobial prophylaxis was administered  A Ibanez catheter was aseptically placed and started to drain clear yellow urine  A laparotomy drape was placed  A time out was performed to confirm the correct patient and correct procedure  The table was placed in Trendelenburg position of 15 degrees  An Auvard speculum was inserted into the vagina and a Bonnie retractor was used to visualize the cervix  A single tooth tenaculum was used to grasp the anterior lip of the cervix  A Rakesh retractor was placed to protect the vaginal wall anteriorly and laterally  Electrocautery was used to make a circumferential and perpendicular incision deep into the mucosa and submucosa at the level of the cervicovaginal junction  The vaginal mucosa and submucosa were dissected off the cervix as well as the anterior and then posterior lower uterine segment with blunt dissection       The uterosacral ligaments were palpated  The posterior cul-de-sac was entered sharply with Metzenbaums  The Auvard speculum was replaced over the posterior peritoneum  The right uterosacral ligament was conservatively grasped with half a Angela-Balantine clamp posteriorly and the other half was wondered over anteriorly  The ligament was divided with curved Kemp scissors and ligated with an 0 Vicryl pop-off suture using a Angela fixation stitch and tagged  This step was repeated on the contralateral side  Excellent hemostasis was observed  The cardinal ligaments were conservatively clamped, divided and the pedicles fixation ligated and tagged in a similar fashion  The bladder was identified and retracted anteriorly with a Henderson retractor  Further anterior cervical sharp dissection was performed with Metzenbaum scissors  The anterior peritoneum was entered sharply between the vesicouterine plane  The uterine fundus was palpated  A Angela-Dover Foxcroft clamp and Thubrikar Aortic Valve needle  were used to progress the dissection cephalad, staying medial to each successive pedicle  Each target tissue was brought gently into view, supported, clamped, divided, fixation stitched with 0 Vicryl pop-off sutures, and then tagged  This sequence was used to capture the uterine vessels within the bilateral broad ligament attachments, the utero-ovarian ligaments and the round ligaments  The freed specimen was retrieved vaginally  The lips of the cuff were separately grasped with Cassandra clamps  The cuff was closed with running 0 Vicryl suture  Good hemostasis was observed  Please see the separate dictation note by Dr Carri Guzmán regarding the right sacrospinous ligament suspension of the vaginal cuff  The Ibanez was removed  Top gloves were removed  The table was leveled  A cystoscope with 70 degree lens was gently placed into the urethral meatus and the length of the urethra was inspected   The cystoscope was advanced to achieve visualization of the intravesicular cavity  All needle, sponge, instrument counts were noted to be correct ×2 at the end of the procedure  Dr Tim Ewing was present and participated in all key portions of the procedure        I was present for the entire procedure    Patient Disposition:  PACU     SIGNATURE: Salvatore Valiente MD  DATE: November 27, 2018  TIME: 12:21 PM

## 2018-11-28 VITALS
HEART RATE: 80 BPM | BODY MASS INDEX: 29.18 KG/M2 | RESPIRATION RATE: 18 BRPM | DIASTOLIC BLOOD PRESSURE: 73 MMHG | WEIGHT: 139 LBS | SYSTOLIC BLOOD PRESSURE: 133 MMHG | OXYGEN SATURATION: 94 % | TEMPERATURE: 98.1 F | HEIGHT: 58 IN

## 2018-11-28 LAB
ERYTHROCYTE [DISTWIDTH] IN BLOOD BY AUTOMATED COUNT: 13.5 % (ref 11.6–15.1)
HCT VFR BLD AUTO: 37 % (ref 34.8–46.1)
HGB BLD-MCNC: 11.8 G/DL (ref 11.5–15.4)
MCH RBC QN AUTO: 30 PG (ref 26.8–34.3)
MCHC RBC AUTO-ENTMCNC: 31.9 G/DL (ref 31.4–37.4)
MCV RBC AUTO: 94 FL (ref 82–98)
PLATELET # BLD AUTO: 322 THOUSANDS/UL (ref 149–390)
PMV BLD AUTO: 10.3 FL (ref 8.9–12.7)
RBC # BLD AUTO: 3.93 MILLION/UL (ref 3.81–5.12)
WBC # BLD AUTO: 14.47 THOUSAND/UL (ref 4.31–10.16)

## 2018-11-28 PROCEDURE — 85027 COMPLETE CBC AUTOMATED: CPT | Performed by: OBSTETRICS & GYNECOLOGY

## 2018-11-28 PROCEDURE — 99024 POSTOP FOLLOW-UP VISIT: CPT | Performed by: OBSTETRICS & GYNECOLOGY

## 2018-11-28 RX ORDER — DOCUSATE SODIUM 100 MG/1
100 CAPSULE, LIQUID FILLED ORAL 2 TIMES DAILY
Qty: 10 CAPSULE | Refills: 0 | Status: SHIPPED | OUTPATIENT
Start: 2018-11-28 | End: 2018-12-11

## 2018-11-28 RX ORDER — OXYCODONE HYDROCHLORIDE AND ACETAMINOPHEN 5; 325 MG/1; MG/1
1 TABLET ORAL EVERY 4 HOURS PRN
Qty: 10 TABLET | Refills: 0 | Status: SHIPPED | OUTPATIENT
Start: 2018-11-28 | End: 2018-11-29 | Stop reason: DRUGHIGH

## 2018-11-28 RX ADMIN — IBUPROFEN 600 MG: 600 TABLET, FILM COATED ORAL at 09:13

## 2018-11-28 RX ADMIN — OXYCODONE HYDROCHLORIDE AND ACETAMINOPHEN 1 TABLET: 5; 325 TABLET ORAL at 14:24

## 2018-11-28 RX ADMIN — DOCUSATE SODIUM 100 MG: 100 CAPSULE, LIQUID FILLED ORAL at 09:13

## 2018-11-28 RX ADMIN — OXYCODONE HYDROCHLORIDE AND ACETAMINOPHEN 1 TABLET: 5; 325 TABLET ORAL at 06:01

## 2018-11-28 RX ADMIN — DEXTROSE, SODIUM CHLORIDE, AND POTASSIUM CHLORIDE 125 ML/HR: 5; .45; .15 INJECTION INTRAVENOUS at 01:46

## 2018-11-28 RX ADMIN — ENOXAPARIN SODIUM 40 MG: 40 INJECTION SUBCUTANEOUS at 09:14

## 2018-11-28 RX ADMIN — PANTOPRAZOLE SODIUM 20 MG: 20 TABLET, DELAYED RELEASE ORAL at 06:02

## 2018-11-28 NOTE — PLAN OF CARE
Problem: PAIN - ADULT  Goal: Verbalizes/displays adequate comfort level or baseline comfort level  Interventions:  - Encourage patient to monitor pain and request assistance  - Assess pain using appropriate pain scale  - Administer analgesics based on type and severity of pain and evaluate response  - Implement non-pharmacological measures as appropriate and evaluate response  - Consider cultural and social influences on pain and pain management  - Notify physician/advanced practitioner if interventions unsuccessful or patient reports new pain   Outcome: Progressing      Problem: INFECTION - ADULT  Goal: Absence or prevention of progression during hospitalization  INTERVENTIONS:  - Assess and monitor for signs and symptoms of infection  - Monitor lab/diagnostic results  - Monitor all insertion sites, i e  indwelling lines, tubes, and drains  - Monitor endotracheal (as able) and nasal secretions for changes in amount and color  - [place] appropriate cooling/warming therapies per order  - Administer medications as ordered  - Instruct and encourage patient and family to use good hand hygiene technique  - Identify and instruct in appropriate isolation precautions for identified infection/condition   Outcome: Progressing    Goal: Absence of fever/infection during neutropenic period  INTERVENTIONS:  - Monitor WBC  - Implement neutropenic guidelines   Outcome: Progressing      Problem: SAFETY ADULT  Goal: Patient will remain free of falls  INTERVENTIONS:  - Assess patient frequently for physical needs  -  Identify cognitive and physical deficits and behaviors that affect risk of falls    -  [place] fall precautions as indicated by assessment   - Educate patient/family on patient safety including physical limitations  - Instruct patient to call for assistance with activity based on assessment  - Modify environment to reduce risk of injury  - Consider OT/PT consult to assist with strengthening/mobility   Outcome: Progressing    Goal: Maintain or return to baseline ADL function  INTERVENTIONS:  -  Assess patient's ability to carry out ADLs; assess patient's baseline for ADL function and identify physical deficits which impact ability to perform ADLs (bathing, care of mouth/teeth, toileting, grooming, dressing, etc )  - Assess/evaluate cause of self-care deficits   - Assess range of motion  - Assess patient's mobility; develop plan if impaired  - Assess patient's need for assistive devices and provide as appropriate  - Encourage maximum independence but intervene and supervise when necessary  ¯ Involve family in performance of ADLs  ¯ Assess for home care needs following discharge   ¯ Request OT consult to assist with ADL evaluation and planning for discharge  ¯ Provide patient education as appropriate   Outcome: Progressing    Goal: Maintain or return mobility status to optimal level  INTERVENTIONS:  - Assess patient's baseline mobility status (ambulation, transfers, stairs, etc )    - Identify cognitive and physical deficits and behaviors that affect mobility  - Identify mobility aids required to assist with transfers and/or ambulation (gait belt, sit-to-stand, lift, walker, cane, etc )  - [place] fall precautions as indicated by assessment  - Record patient progress and toleration of activity level on Mobility SBAR; progress patient to next Phase/Stage  - Instruct patient to call for assistance with activity based on assessment  - Request Rehabilitation consult to assist with strengthening/weightbearing, etc    Outcome: Progressing      Problem: DISCHARGE PLANNING  Goal: Discharge to home or other facility with appropriate resources  INTERVENTIONS:  - Identify barriers to discharge w/patient and caregiver  - Arrange for needed discharge resources and transportation as appropriate  - Identify discharge learning needs (meds, wound care, etc )  - Arrange for interpretive services to assist at discharge as needed  - Refer to Case Management Department for coordinating discharge planning if the patient needs post-hospital services based on physician/advanced practitioner order or complex needs related to functional status, cognitive ability, or social support system   Outcome: Progressing

## 2018-11-28 NOTE — PROGRESS NOTES
Physician Progress Note - GYN     Uri Monaco 70 y o  female MRN: [id number] 11/28/2018  Unit/Bed#: E2 -01 Encounter: [id number]          ASSESSMENT:  70 y o  p/w POP s/p TVH, SSLS, cysto now POD # 1 and doing well  PLAN:   Post-operative care: Atelectasis / pneumia ppx: cont  incentive spirometry              Vaginal packing: removed              f/u AM labs              d/c galvez & f/u 1st void  Pulse oximetry showing oxygen saturation 93% while in room; 94-99% on chart: denies h/o tobacco, emphysema, asthma; monitor  Pain:  Motrin, percocet, oxycodone, dilaudid   Dr Buddy Salas to sign pain script on chart  GI PPx: colace, protonix  FEN: regular  DVT ppx: SCDs + Dr Buddy Salas to consider lovenox this am after CBC  HTN:  Metoprolol prn; home losartan/HCTZ held  HLD: home statin held  Dispo: anticipate d/c home on 11/28        SUBJECTIVE:    Overnight: no acute events   Pain: 2/10 at rest; 7/10 on movement; pt resting comfortably  Tolerating Oral Intake: is tolerating PO liquids and solids  Appetite: pt reports she is not hungry  Voiding:  Galvez; to be removed later this am then f/u 1st void  Flatus: no  Bowel Movement: no  Ambulating: Galvez still in; has not attempted to ambulate  Chest Pain: no  Shortness of Breath: no  Leg Pain/Discomfort: no  Vaginal Bleeding: denies  Other:       OBJECTIVE:     Vitals:   Patient Vitals for the past 24 hrs:   BP Temp Temp src Pulse Resp SpO2 Height Weight   11/28/18 0552 146/74 97 5 °F (36 4 °C) Tympanic 71 18 94 % - -   11/27/18 2300 129/60 99 3 °F (37 4 °C) Temporal 90 18 96 % - -   11/27/18 1916 120/58 97 6 °F (36 4 °C) Temporal 78 18 96 % - -   11/27/18 1523 152/70 (!) 97 4 °F (36 3 °C) Temporal 74 18 92 % - -   11/27/18 1440 - - - 72 15 97 % - -   11/27/18 1433 157/74 - - 76 17 96 % - -   11/27/18 1403 159/77 - - 68 16 96 % - -   11/27/18 1333 158/73 - - 72 15 94 % - -   11/27/18 1323 156/72 97 7 °F (36 5 °C) - 70 15 97 % - -   11/27/18 1318 - - - 66 17 97 % - -   11/27/18 1308 145/73 - - 72 19 97 % - -   11/27/18 1253 147/71 - - 68 16 96 % - -   11/27/18 1243 - - - 66 16 97 % - -   11/27/18 1238 156/71 - - 80 18 98 % - -   11/27/18 1223 146/72 - - 74 18 99 % - -   11/27/18 1208 156/79 98 4 °F (36 9 °C) - 84 18 99 % - -   11/27/18 0757 148/89 (!) 97 2 °F (36 2 °C) Tympanic 85 18 98 % 4' 10" (1 473 m) 63 kg (139 lb)     Oxygen Therapy  SpO2: 94 %  O2 Flow Rate (L/min): 1 L/min      I/O       11/26 0701 - 11/27 0700 11/27 0701 - 11/28 0700    I V  (mL/kg)  3022 9 (48)    Total Intake(mL/kg)  3022 9 (48)    Urine (mL/kg/hr)  2100    Blood  150    Total Output   2250    Net   +772 9                Intake/Output Summary (Last 24 hours) at 11/28/18 0602  Last data filed at 11/28/18 2617   Gross per 24 hour   Intake          3022 92 ml   Output             2250 ml   Net           772 92 ml       Physical exam:   General: No Acute Distress   Cardiovascular: Regular Rate and Rhythm   Lungs: Clear to Auscultation Bilaterally, no wheezing, rhonchi or rales   Abdomen:     Non-tender,     No rebound or guarding;     Lower Extremities: negative Feli's sign bilaterally   Neuro: Alert, cooperative          Medications:  Continuous Infusions:    dextrose 5 % and sodium chloride 0 45 % with KCl 20 mEq/L 125 mL/hr Last Rate: 125 mL/hr (11/28/18 0146)       Scheduled Meds:    Current Facility-Administered Medications:  dextrose 5 % and sodium chloride 0 45 % with KCl 20 mEq/L 125 mL/hr Intravenous Continuous Marcy Marlow MD Last Rate: 125 mL/hr (11/28/18 0146)   docusate sodium 100 mg Oral BID Marcy Marlow MD    enoxaparin 40 mg Subcutaneous Daily Marcy Marlow MD    HYDROmorphone 0 5 mg Intravenous Q3H PRN Marcy Marlow MD    ibuprofen 600 mg Oral Q6H PRN Marcy Marlow MD    ondansetron 4 mg Intravenous Q6H PRN Marcy Marlow MD    oxyCODONE 10 mg Oral Q4H PRN Marcy Marlow MD    oxyCODONE-acetaminophen 1 tablet Oral Q4H PRN Marcy Marlow MD    pantoprazole 20 mg Oral Daily Before Breakfast Argenis Davey MD        PRN Meds:  HYDROmorphone    ibuprofen    ondansetron    oxyCODONE    oxyCODONE-acetaminophen    Medication Doses in [place] 24 hours:  Medication Administration - last 24 hours from 11/27/2018 0600 to 11/28/2018 0600       Date/Time Order Dose Route Action Action by     11/27/2018 1640 sodium chloride 0 9 % infusion 0 mL/hr Intravenous Stopped Perla Mcneill RN     11/27/2018 1434 sodium chloride 0 9 % infusion 0 mL/hr Intravenous Stopped Hanh Gambino RN     11/27/2018 1208 sodium chloride 0 9 % infusion 125 mL/hr Intravenous Continued from 3150 Agrivi, RN     11/27/2018 1050 sodium chloride 0 9 % infusion   Intravenous Anesthesia Volume Adjustment Waqas Reed CRNA     11/27/2018 0930 sodium chloride 0 9 % infusion 125 mL/hr Intravenous New Bag Bryanna FAIRCHILD Umberto     11/27/2018 1318 fentaNYL (SUBLIMAZE) injection 25 mcg 25 mcg Intravenous Given Hanh Gambino RN     11/27/2018 1308 fentaNYL (SUBLIMAZE) injection 25 mcg 25 mcg Intravenous Given Hanh Gambino RN     11/27/2018 1243 fentaNYL (SUBLIMAZE) injection 25 mcg 25 mcg Intravenous Given Hanh Gambino RN     11/27/2018 1238 fentaNYL (SUBLIMAZE) injection 25 mcg 25 mcg Intravenous Given Hanh Gambino RN     11/28/2018 0146 dextrose 5 % and sodium chloride 0 45 % with KCl 20 mEq/L infusion 125 mL/hr Intravenous Teresavæluis manuelet [address] [facility], [facility]     11/27/2018 1738 dextrose 5 % and sodium chloride 0 45 % with KCl 20 mEq/L infusion 125 mL/hr Intravenous [address] [facility], RN     11/27/2018 1738 docusate sodium (COLACE) capsule 100 mg 100 mg Oral Given Perla Mcneill RN     11/27/2018 1516 docusate sodium (COLACE) capsule 100 mg 100 mg Oral Not Given Perla Mcneill RN     11/27/2018 1541 ondansetron (ZOFRAN) injection 4 mg 4 mg Intravenous Given Perla Mcneill RN     11/27/2018 1542 pantoprazole (PROTONIX) EC tablet 20 mg 20 mg Oral Not Given Perla Mcneill RN 2018 2326 oxyCODONE-acetaminophen (PERCOCET) 5-325 mg per tablet 1 tablet 1 tablet Oral Given Carli James RN     2018 1743 oxyCODONE (ROXICODONE) IR tablet 10 mg 10 mg Oral Given Shannan Saba RN     2018 1440 HYDROmorphone (DILAUDID) injection 0 5 mg 0 5 mg Intravenous Given Rey Weinberg RN          Invasive  Devices: Invasive Devices     Peripheral Intravenous Line            Peripheral IV 18 Right Hand less than 1 day          Drain            Urethral Catheter Non-latex 16 Fr  less than 1 day                    Additional Vitals:    Temp  Min: 97 2 °F (36 2 °C)  Max: 99 3 °F (37 4 °C)    IBW: 40 9 kg            Invasive/non-invasive ventilation settings:  Respiratory    Lab Data (Last 4 hours)    None         O2/Vent Data (Last 4 hours)    None                Code Status: Level 1 - Full Code  Advance Directive and Living Will:      Power of :    POLST:        Past Medical History:   Diagnosis Date    Arthritis     Hyperlipidemia     Hypertension     Infectious viral hepatitis     pt reports" tested positive for Hep C but doesn't have it"    Prolapsed uterus     Wears glasses      Past Surgical History:   Procedure Laterality Date    HYSTEROSCOPY W/ POLYPECTOMY      "years ago Dr Demetrio Garnica removed some polyps"    REPAIR RECTOCELE      TUBAL LIGATION       OB History    Para Term  AB Living   3 3           SAB TAB Ectopic Multiple Live Births                  # Outcome Date GA Lbr Abhi/2nd Weight Sex Delivery Anes PTL Lv   3 Para            2 Para            1 Para                  reports that she has never smoked  She has never used smokeless tobacco  She reports that she drinks alcohol  She reports that she does not use drugs  There is no immunization history on file for this patient    No Known Allergies  Current Discharge Medication List      CONTINUE these medications which have NOT CHANGED    Details   CALCIUM PO Take 1,000 mg by mouth daily        Cholecalciferol (VITAMIN D PO) Take 50 mcg by mouth daily        docusate sodium (COLACE) 100 mg capsule Take 100 mg by mouth 2 (two) times a day as needed for constipation      losartan-hydrochlorothiazide (HYZAAR) 100-25 MG per tablet Take 1 tablet by mouth daily        Multiple Vitamin (MULTI-VITAMIN DAILY PO) Take 2 tablets by mouth daily        simvastatin (ZOCOR) 40 mg tablet Take 1 tablet by mouth daily             Signature / Title: Tomi Ortiz MD, MD, Resident - Ob/Gyn

## 2018-11-28 NOTE — PROGRESS NOTES
Patient was given all discharge instructions and prescriptions  Patient verbalized understanding of all instructions  Gave patient andre pads  IV was removed patient was wheeled down to transportation by PCA

## 2018-11-28 NOTE — PROGRESS NOTES
Physician Progress Note - GYN Post-op check    Catana Page 70 y o  female MRN: [id number] 11/27/2018  Unit/Bed#: E2 MS Lalit-01 Encounter: [id number]          ASSESSMENT:  70 y o  p/w POP s/p TVH, SSLS, cysto now POD # 0 and doing well  PLAN:   Post-operative care: Atelectasis / pneumia ppx: cont  incentive spirometry   Vaginal packing: remove in am   *f/u AM labs   *d/c galvez in AM & f/u 1st void  Pain:  Motrin, percocet, oxycodone, dilaudid  GI PPx: colace, protonix  FEN: regular, d5 ½ NS + 20K  DVT ppx: SCDs + lovenox in am   HTN:  Metoprolol prn; home losartan/HCTZ held  HLD: home statin held  Dispo: anticipate d/c home on 11/28        SUBJECTIVE:    Pain: 2/10  Tolerating Oral Intake: is tolerating PO liquids and solids  Voiding:  Galvez still in  Flatus: no  Bowel Movement: no  Ambulating: Galvez still in; has not attempted to ambulate  Chest Pain: no  Shortness of Breath: no  Leg Pain/Discomfort: no  Vaginal Bleeding: denies  Other:       OBJECTIVE:     Vitals:   Patient Vitals for the past 24 hrs:   BP Temp Temp src Pulse Resp SpO2 Height Weight   11/27/18 1916 120/58 97 6 °F (36 4 °C) Temporal 78 18 96 % - -   11/27/18 1523 152/70 (!) 97 4 °F (36 3 °C) Temporal 74 18 92 % - -   11/27/18 1440 - - - 72 15 97 % - -   11/27/18 1433 157/74 - - 76 17 96 % - -   11/27/18 1403 159/77 - - 68 16 96 % - -   11/27/18 1333 158/73 - - 72 15 94 % - -   11/27/18 1323 156/72 97 7 °F (36 5 °C) - 70 15 97 % - -   11/27/18 1318 - - - 66 17 97 % - -   11/27/18 1308 145/73 - - 72 19 97 % - -   11/27/18 1253 147/71 - - 68 16 96 % - -   11/27/18 1243 - - - 66 16 97 % - -   11/27/18 1238 156/71 - - 80 18 98 % - -   11/27/18 1223 146/72 - - 74 18 99 % - -   11/27/18 1208 156/79 98 4 °F (36 9 °C) - 84 18 99 % - -   11/27/18 0757 148/89 (!) 97 2 °F (36 2 °C) Tympanic 85 18 98 % 4' 10" (1 473 m) 63 kg (139 lb)     Oxygen Therapy  SpO2: 96 %  O2 Flow Rate (L/min): 1 L/min      I/O       11/26 0701 - 11/27 0700 11/27 0701 - 11/28 0700    I V  (mL/kg)  2168 8 (34 4)    Total Intake(mL/kg)  2168 8 (34 4)    Urine (mL/kg/hr)  500    Blood  150    Total Output   650    Net   +1518 8                Intake/Output Summary (Last 24 hours) at 11/27/18 2009  Last data filed at 11/27/18 1859   Gross per 24 hour   Intake          2168 75 ml   Output              650 ml   Net          1518 75 ml           Physical exam:   General: No Acute Distress   Abdomen:     Non-tender,     No rebound or guarding;     Lower Extremities: negative Efli's sign bilaterally   Neuro: Alert, cooperative      Laboratory Studies:                  Results from last 7 days  Lab Units 11/27/18  0816   POC GLUCOSE mg/dl 92       Medications:  Continuous Infusions:    dextrose 5 % and sodium chloride 0 45 % with KCl 20 mEq/L 125 mL/hr Last Rate: 125 mL/hr (11/27/18 1738)   sodium chloride 125 mL/hr Last Rate: Stopped (11/27/18 1434)       Scheduled Meds:    Current Facility-Administered Medications:  dextrose 5 % and sodium chloride 0 45 % with KCl 20 mEq/L 125 mL/hr Intravenous Continuous Kodak Buenrostro MD Last Rate: 125 mL/hr (11/27/18 1738)   docusate sodium 100 mg Oral BID Kodak Buenrostro MD    [facility] ON 11/28/2018] enoxaparin 40 mg Subcutaneous Daily Kodak Buenrostro MD    HYDROmorphone 0 5 mg Intravenous Q3H PRN Kodak Buenrostro MD    ibuprofen 600 mg Oral Q6H PRN Kodak Buenrostro MD    ondansetron 4 mg Intravenous Q6H PRN Kodak Buenrostro MD    oxyCODONE 10 mg Oral Q4H PRN Kodak Buenrostro MD    oxyCODONE-acetaminophen 1 tablet Oral Q4H PRN Kodak Buenrostro MD    pantoprazole 20 mg Oral Daily Before Breakfast Kodak Buenrostro MD    sodium chloride 125 mL/hr Intravenous Continuous Onesimo Berry MD Last Rate: Stopped (11/27/18 1434)       PRN Meds:  HYDROmorphone    ibuprofen    ondansetron    oxyCODONE    oxyCODONE-acetaminophen    Medication Doses in [place]iling 24 hours:  Medication Administration - last 24 hours from 11/26/2018 2009 to 11/27/2018 2009       Date/Time Order Dose Route Action Action by     11/27/2018 1640 sodium chloride 0 9 % infusion 0 mL/hr Intravenous Stopped Breanne Muñiz RN     11/27/2018 1434 sodium chloride 0 9 % infusion 0 mL/hr Intravenous Stopped Carmen Joe, SALVATORE     11/27/2018 1208 sodium chloride 0 9 % infusion 125 mL/hr Intravenous Continued from [address]150 [place], RN     11/27/2018 1050 sodium chloride 0 9 % infusion   Intravenous Anesthesia Volume Adjustment Waqas Diandra Villafuerte CRNA     11/27/2018 0930 sodium chloride 0 9 % infusion 125 mL/hr Intravenous New Bag Bryanna FAIRCHILD Umberto     11/27/2018 1318 fentaNYL (SUBLIMAZE) injection 25 mcg 25 mcg Intravenous Given Carmen Joe, SALVATORE     11/27/2018 1308 fentaNYL (SUBLIMAZE) injection 25 mcg 25 mcg Intravenous Given Carmen Joe RN     11/27/2018 1243 fentaNYL (SUBLIMAZE) injection 25 mcg 25 mcg Intravenous Given Carmen Joe RN     11/27/2018 1238 fentaNYL (SUBLIMAZE) injection 25 mcg 25 mcg Intravenous Given Carmen Joe RN     11/27/2018 1738 dextrose 5 % and sodium chloride 0 45 % with KCl 20 mEq/L infusion 125 mL/hr Intravenous [facility], RN     11/27/2018 1738 docusate sodium (COLACE) capsule 100 mg 100 mg Oral Given Breanne Muñiz RN     11/27/2018 1516 docusate sodium (COLACE) capsule 100 mg 100 mg Oral Not Given Breanne Muñiz RN     11/27/2018 1541 ondansetron (ZOFRAN) injection 4 mg 4 mg Intravenous Given Breanne Muñiz RN     11/27/2018 1542 pantoprazole (PROTONIX) EC tablet 20 mg 20 mg Oral Not Given Breanne Muñiz RN     11/27/2018 1743 oxyCODONE (ROXICODONE) IR tablet 10 mg 10 mg Oral Given Breanne Muñiz RN     11/27/2018 1440 HYDROmorphone (DILAUDID) injection 0 5 mg 0 5 mg Intravenous Given Carmen Joe RN          Invasive  Devices:   Invasive Devices     Peripheral Intravenous Line            Peripheral IV 11/27/18 Right Hand less than 1 day          Drain            Urethral Catheter Non-latex 16 Fr  less than 1 day Additional Vitals:    Temp  Min: 97 2 °F (36 2 °C)  Max: 98 4 °F (36 9 °C)    IBW: 40 9 kg            Invasive/non-invasive ventilation settings:  Respiratory    Lab Data (Last 4 hours)    None         O2/Vent Data (Last 4 hours)    None                Code Status: Level 1 - Full Code  Advance Directive and Living Will:      Power of :    POLST:        Past Medical History:   Diagnosis Date    Arthritis     Hyperlipidemia     Hypertension     Infectious viral hepatitis     pt reports" tested positive for Hep C but doesn't have it"    Prolapsed uterus     Wears glasses      Past Surgical History:   Procedure Laterality Date    HYSTEROSCOPY W/ POLYPECTOMY      "years ago Dr Rafi Nunez removed some polyps"   [address]       OB History    Para Term  AB Living   3 3           SAB TAB Ectopic Multiple Live Births                  # Outcome Date GA Lbr Abhi/2nd Weight Sex Delivery Anes PTL Lv   3 Para            2 Para            1 Para                  reports that she has never smoked  She has never used smokeless tobacco  She reports that she drinks alcohol  She reports that she does not use drugs  There is no immunization history on file for this patient    No Known Allergies  Current Discharge Medication List      CONTINUE these medications which have NOT CHANGED    Details   CALCIUM PO Take 1,000 mg by mouth daily        Cholecalciferol (VITAMIN D PO) Take 50 mcg by mouth daily        docusate sodium (COLACE) 100 mg capsule Take 100 mg by mouth 2 (two) times a day as needed for constipation      losartan-hydrochlorothiazide (HYZAAR) 100-25 MG per tablet Take 1 tablet by mouth daily        Multiple Vitamin (MULTI-VITAMIN DAILY PO) Take 2 tablets by mouth daily        simvastatin (ZOCOR) 40 mg tablet Take 1 tablet by mouth daily             Signature / Title: Francisco Javier Dickerson MD, MD, Resident - Ob/Gyn

## 2018-11-29 DIAGNOSIS — G89.18 POSTOPERATIVE PAIN: Primary | ICD-10-CM

## 2018-11-29 RX ORDER — OXYCODONE HYDROCHLORIDE AND ACETAMINOPHEN 5; 325 MG/1; MG/1
1 TABLET ORAL EVERY 4 HOURS PRN
Qty: 16 TABLET | Refills: 0 | Status: SHIPPED | OUTPATIENT
Start: 2018-11-29 | End: 2018-12-03

## 2018-12-03 ENCOUNTER — TELEPHONE (OUTPATIENT)
Dept: OBGYN CLINIC | Facility: CLINIC | Age: 68
End: 2018-12-03

## 2018-12-11 ENCOUNTER — OFFICE VISIT (OUTPATIENT)
Dept: OBGYN CLINIC | Facility: CLINIC | Age: 68
End: 2018-12-11

## 2018-12-11 VITALS
DIASTOLIC BLOOD PRESSURE: 70 MMHG | WEIGHT: 137.6 LBS | BODY MASS INDEX: 28.88 KG/M2 | HEIGHT: 58 IN | SYSTOLIC BLOOD PRESSURE: 124 MMHG

## 2018-12-11 DIAGNOSIS — Z09 FOLLOW-UP EXAMINATION AFTER GYNECOLOGICAL SURGERY: Primary | ICD-10-CM

## 2018-12-11 PROCEDURE — 99024 POSTOP FOLLOW-UP VISIT: CPT | Performed by: OBSTETRICS & GYNECOLOGY

## 2018-12-11 RX ORDER — IRBESARTAN AND HYDROCHLOROTHIAZIDE 150; 12.5 MG/1; MG/1
TABLET, FILM COATED ORAL AS NEEDED
COMMUNITY
End: 2018-12-24

## 2018-12-11 NOTE — PROGRESS NOTES
Kassandra Pennington is here today following an uneventful vaginal hysterectomy with sacral spinous ligament fixation  She is doing well and offers no complaints or concerns at this time  /70 (BP Location: Left arm, Patient Position: Sitting, Cuff Size: Standard)   Ht 4' 10" (1 473 m)   Wt 62 4 kg (137 lb 9 6 oz)   BMI 28 76 kg/m²   Review of Systems:  Skin: No rashes or discolorations of any concern  RESP: Denies SOB, no cough  CV: Denies chest pain or palpitations  GI: Denies abdominal pain, heartburn, nausea, vomiting, changes in bowel habits  : Denies dysuria, frequency, CVA tenderness, incontinence and hematuria  Genitalia: Denies abnormal vaginal discharge, external lesions, rashes, pelvic pain, pressure, abnormal bleeding  Rectal:  Denies pain, bleeding, hemorrhoids,    Her incisions are healing well with no signs of disruption or infection  Her pelvic exam reveals a well-healed vaginal cuff  There is a pinpoint area of bleeding on the right side which is easily taken care of with a single silver nitrate stick  The tissue was pliable and mobile with no tenderness  We reviewed the surgical findings and the pathology from the procedure  Recommendations are to increase activity with the exception of heavy lifting, pushing pulling and intercourse       Patient is to return 2 weeks for further follow-up

## 2018-12-24 ENCOUNTER — OFFICE VISIT (OUTPATIENT)
Dept: OBGYN CLINIC | Facility: CLINIC | Age: 68
End: 2018-12-24

## 2018-12-24 VITALS — BODY MASS INDEX: 28.47 KG/M2 | WEIGHT: 136.2 LBS | DIASTOLIC BLOOD PRESSURE: 78 MMHG | SYSTOLIC BLOOD PRESSURE: 120 MMHG

## 2018-12-24 DIAGNOSIS — Z09 FOLLOW-UP EXAMINATION AFTER GYNECOLOGICAL SURGERY: Primary | ICD-10-CM

## 2018-12-24 PROCEDURE — 99024 POSTOP FOLLOW-UP VISIT: CPT | Performed by: OBSTETRICS & GYNECOLOGY

## 2018-12-24 PROCEDURE — 1124F ACP DISCUSS-NO DSCNMKR DOCD: CPT | Performed by: OBSTETRICS & GYNECOLOGY

## 2018-12-24 RX ORDER — METHYLPREDNISOLONE 4 MG/1
TABLET ORAL
COMMUNITY
Start: 2018-12-20

## 2018-12-24 NOTE — PROGRESS NOTES
Mary Frederick is here today following an uneventful vaginal hysterectomy and sacral spinous ligament fixation  She is doing well and offers no complaints or concerns at this time  /78 (BP Location: Left arm, Patient Position: Sitting, Cuff Size: Standard)   Wt 61 8 kg (136 lb 3 2 oz)   BMI 28 47 kg/m²   Review of Systems:  Skin: No rashes or discolorations of any concern  RESP: Denies SOB, no cough  CV: Denies chest pain or palpitations  GI: Denies abdominal pain, heartburn, nausea, vomiting, changes in bowel habits  : Denies dysuria, frequency, CVA tenderness, incontinence and hematuria  Genitalia: Denies abnormal vaginal discharge, external lesions, rashes, pelvic pain, pressure, abnormal bleeding  Rectal:  Denies pain, bleeding, hemorrhoids,    Her incisions are healing well with no signs of disruption or infection  Her pelvic exam reveals well-supported vaginal cuff that is mobile and nontender   Recommendations are return to all normal activity with the exception of sexual intercourse for which she is to wait 2 additional weeks   Patient is to return for annual visit or as needed

## 2019-10-07 ENCOUNTER — ANNUAL EXAM (OUTPATIENT)
Dept: OBGYN CLINIC | Facility: CLINIC | Age: 69
End: 2019-10-07
Payer: MEDICARE

## 2019-10-07 VITALS
DIASTOLIC BLOOD PRESSURE: 78 MMHG | WEIGHT: 137 LBS | SYSTOLIC BLOOD PRESSURE: 128 MMHG | HEIGHT: 58 IN | BODY MASS INDEX: 28.76 KG/M2

## 2019-10-07 DIAGNOSIS — Z12.31 ENCOUNTER FOR SCREENING MAMMOGRAM FOR MALIGNANT NEOPLASM OF BREAST: ICD-10-CM

## 2019-10-07 DIAGNOSIS — Z01.419 ENCOUNTER FOR GYNECOLOGICAL EXAMINATION WITHOUT ABNORMAL FINDING: Primary | ICD-10-CM

## 2019-10-07 PROBLEM — N81.4 UTERINE PROLAPSE: Status: RESOLVED | Noted: 2018-10-25 | Resolved: 2019-10-07

## 2019-10-07 PROBLEM — N81.4 CYSTOCELE WITH UTERINE PROLAPSE: Status: RESOLVED | Noted: 2018-10-25 | Resolved: 2019-10-07

## 2019-10-07 PROCEDURE — G0101 CA SCREEN;PELVIC/BREAST EXAM: HCPCS | Performed by: OBSTETRICS & GYNECOLOGY

## 2019-10-07 RX ORDER — LOSARTAN POTASSIUM AND HYDROCHLOROTHIAZIDE 25; 100 MG/1; MG/1
TABLET ORAL
COMMUNITY
Start: 2019-07-11

## 2019-10-07 RX ORDER — LOSARTAN POTASSIUM AND HYDROCHLOROTHIAZIDE 25; 100 MG/1; MG/1
TABLET ORAL
COMMUNITY
Start: 2019-03-08

## 2019-10-07 RX ORDER — IRBESARTAN AND HYDROCHLOROTHIAZIDE 150; 12.5 MG/1; MG/1
TABLET, FILM COATED ORAL
COMMUNITY

## 2019-10-07 RX ORDER — SIMVASTATIN 40 MG
TABLET ORAL
COMMUNITY
Start: 2019-07-11

## 2019-10-07 NOTE — PROGRESS NOTES
CC:  Annual exam    HPI: Stew Roldan presents for  Routine annual visit  She is doing well and expresses no complaints or concerns at this time  Past Medical History:  Past Medical History:   Diagnosis Date    Arthritis     Hyperlipidemia     Hypertension     Infectious viral hepatitis     pt reports" tested positive for Hep C but doesn't have it"    Prolapsed uterus     Varicella     Wears glasses        Past Surgical History:  Past Surgical History:   Procedure Laterality Date    HYSTEROSCOPY W/ POLYPECTOMY      "years ago Dr Santa Mccauley removed some polyps"    [place] CYSTOURETHROSCOPY N/A 11/27/2018    Procedure: CYSTO;  Surgeon: Shivam Verduzco MD;  Location: [facility];  Service: Gynecology    [place] [facility] [address] N/A 11/27/2018    Procedure: FIXATION LIGAMENT SACROSPINOUS; COLPORRAPHY;  Surgeon: Shivam Verduzco MD;  [facility];  Service: Gynecology    [place] VAGINAL HYSTERECTOMY,UTERUS 250 GMS/< N/A 11/27/2018    Procedure: TOTAL VAG  HYSTERECTOMY;  Surgeon: Shivam Verduzco MD;  Location: [facility];  Service: Gynecology    REPAIR RECTOCELE      TUBAL LIGATION         Past OB/Gyn History:    Patient is menopausal and status post hysterectomy  Denies any history of sexually transmitted infection  No history of abnormal pap smears       ALLERGIES: No Known Allergies    MEDS:   Current Outpatient Medications:     CALCIUM PO    Cholecalciferol (VITAMIN D PO)    Cholecalciferol 2000 units CAPS    docusate sodium (COLACE) 100 mg capsule    irbesartan-hydrochlorothiazide (AVALIDE) 150-12 5 MG per tablet    losartan-hydrochlorothiazide (HYZAAR) 100-25 MG per tablet    Multiple Vitamin (MULTI-VITAMIN DAILY PO)    simvastatin (ZOCOR) 40 mg tablet    losartan-hydrochlorothiazide (HYZAAR) 100-25 MG per tablet    losartan-hydrochlorothiazide (HYZAAR) 100-25 MG per tablet    Methylprednisolone 4 MG TBPK    simvastatin (ZOCOR) 40 mg tablet    Family History:  Family History   Problem Relation Age of Onset    Hypertension Mother     Heart failure Father     Hypertension Sister     Hypertension Brother        Social History:  Social History     Socioeconomic History    Marital status: /Civil Union     Spouse name: Not on file    Number of children: Not on file    Years of education: Not on file    Highest education level: Not on file   Occupational History    Not on file   Social Needs    Financial resource strain: Not on file    Food insecurity:     Worry: Not on file     Inability: Not on file    Transportation needs:     Medical: Not on file     Non-medical: Not on file   Tobacco Use    Smoking status: Never Smoker    Smokeless tobacco: Never Used   Substance and Sexual Activity    Alcohol use: Yes     Comment: occasionally    Drug use: No    Sexual activity: Not Currently   Lifestyle    Physical activity:     Days per week: Not on file     Minutes per session: Not on file    Stress: Not on file   Relationships    Social connections:     Talks on phone: Not on file     Gets together: Not on file     Attends [employer] service: Not on file     Active member of club or organization: Not on file     Attends meetings of clubs or organizations: Not on file     Relationship status: Not on file    Intimate partner violence:     Fear of current or ex partner: Not on file     Emotionally abused: Not on file     Physically abused: Not on file     Forced sexual activity: Not on file   Other Topics Concern    Not on file   Social History Narrative    Not on file         Review of Systems:  Gen:   Denies fatigue, chills, nausea, vomiting, fever  Skin: No rashes or discolorations of any concern  RESP: Denies SOB, no cough  CV: Denies chest pain or palpitations  Breasts: Denies masses, pain, skin changes and nipple discharge  GI: Denies abdominal pain, heartburn, nausea, vomiting, changes in bowel habits     : Denies dysuria, frequency, CVA tenderness, incontinence and hematuria  Genitalia: Denies abnormal vaginal discharge, external lesions, rashes, pelvic pain, pressure, abnormal bleeding  Rectal:  Denies pain, bleeding, hemorrhoids,    Physical Exam:  /78 (BP Location: Left arm, Patient Position: Sitting, Cuff Size: Standard)   Ht 4' 10" (1 473 m)   Wt 62 1 kg (137 lb)   BMI 28 63 kg/m²    Gen: The patient was alert and oriented x3, pleasant well-appearing female in no acute distress  Neck:  Unremarkable, no lymphadenopathy, no thyromegaly, or tenderness  CV:  RRR, no murmurs  Resp:  Clear to auscultation bilaterally, no wheezing  Breasts: Symmetric  No dominant, discrete, fixed  or suspicious masses are noted  No skin or nipple changes  No palpable axillary nodes  No supraclavicular adenopathy  Abd:  Soft, nontender, nondistended, no masses or organomegaly  Back:  No CVA tenderness, no tenderness to palpation along spine  Pelvic  Normal appearing external female genitalia, no visible lesions, no rashes  Vagina is free of discharge, normal vaginal epithelium, no abnormal  lesions, no evidence of prolapse anteriorly or posteriorly  Cervix and uterus are surgically absent  No anoperineal lesions  Rectal:  No masses, tenderness, hemorrhoids, or obvious blood  Skin:  No concerning lesions  Extremeties: No edema      Assessment & Plan:   1  Routine annual exam      RTO  2 years or p r n  2  Encounter for screening mammogram, referral for mammogram given to patient

## 2019-11-11 DIAGNOSIS — Z12.31 ENCOUNTER FOR SCREENING MAMMOGRAM FOR MALIGNANT NEOPLASM OF BREAST: ICD-10-CM

## 2021-10-19 ENCOUNTER — ANNUAL EXAM (OUTPATIENT)
Dept: OBGYN CLINIC | Facility: CLINIC | Age: 71
End: 2021-10-19
Payer: MEDICARE

## 2021-10-19 VITALS
SYSTOLIC BLOOD PRESSURE: 138 MMHG | HEIGHT: 58 IN | BODY MASS INDEX: 29.18 KG/M2 | DIASTOLIC BLOOD PRESSURE: 82 MMHG | WEIGHT: 139 LBS

## 2021-10-19 DIAGNOSIS — Z01.419 ENCOUNTER FOR GYNECOLOGICAL EXAMINATION WITHOUT ABNORMAL FINDING: Primary | ICD-10-CM

## 2021-10-19 DIAGNOSIS — Z12.31 ENCOUNTER FOR SCREENING MAMMOGRAM FOR MALIGNANT NEOPLASM OF BREAST: ICD-10-CM

## 2021-10-19 PROCEDURE — G0101 CA SCREEN;PELVIC/BREAST EXAM: HCPCS | Performed by: OBSTETRICS & GYNECOLOGY

## 2023-11-09 ENCOUNTER — ANNUAL EXAM (OUTPATIENT)
Dept: GYNECOLOGY | Facility: CLINIC | Age: 73
End: 2023-11-09

## 2023-11-09 VITALS
SYSTOLIC BLOOD PRESSURE: 138 MMHG | BODY MASS INDEX: 24.23 KG/M2 | DIASTOLIC BLOOD PRESSURE: 82 MMHG | HEIGHT: 59 IN | WEIGHT: 120.2 LBS

## 2023-11-09 DIAGNOSIS — Z12.31 ENCOUNTER FOR SCREENING MAMMOGRAM FOR BREAST CANCER: ICD-10-CM

## 2023-11-09 DIAGNOSIS — Z01.419 ENCOUNTER FOR ANNUAL ROUTINE GYNECOLOGICAL EXAMINATION: Primary | ICD-10-CM

## 2023-11-09 RX ORDER — ATORVASTATIN CALCIUM 80 MG/1
1 TABLET, FILM COATED ORAL
COMMUNITY
Start: 2022-04-07

## 2023-11-09 RX ORDER — NITROGLYCERIN 0.4 MG/1
1 TABLET SUBLINGUAL
COMMUNITY
Start: 2023-04-03 | End: 2024-04-02

## 2023-11-09 RX ORDER — ALENDRONATE SODIUM 70 MG/1
70 TABLET ORAL WEEKLY
COMMUNITY
Start: 2023-10-03 | End: 2024-10-02

## 2023-11-09 RX ORDER — LOSARTAN POTASSIUM 25 MG/1
0.5 TABLET ORAL
COMMUNITY
Start: 2023-04-07

## 2023-11-09 RX ORDER — ASPIRIN 81 MG/1
81 TABLET, CHEWABLE ORAL DAILY
COMMUNITY
Start: 2023-08-28

## 2023-11-09 RX ORDER — METOPROLOL SUCCINATE 50 MG/1
50 TABLET, EXTENDED RELEASE ORAL DAILY
COMMUNITY
Start: 2023-08-12

## 2023-11-09 NOTE — PROGRESS NOTES
Assessment/Plan:    She does not require a Pap    mammogram reviewed with her including breast density. RX given and she will schedule  Discussed self breast exams    colon cancer screening-she had fecal occult testing done in August    discussed preventive care, regular exercise and a healthy diet      No problem-specific Assessment & Plan notes found for this encounter. Diagnoses and all orders for this visit:    Encounter for annual routine gynecological examination    Encounter for screening mammogram for breast cancer  -     Mammo screening bilateral w 3d & cad; Future    Other orders  -     alendronate (FOSAMAX) 70 mg tablet; Take 70 mg by mouth Once a week  -     aspirin 81 mg chewable tablet; Chew 81 mg daily Chew and swallow  -     atorvastatin (LIPITOR) 80 mg tablet; Take 1 tablet by mouth daily at bedtime  -     losartan (COZAAR) 25 mg tablet; Take 0.5 tablets by mouth  -     metoprolol succinate (TOPROL-XL) 50 mg 24 hr tablet; Take 50 mg by mouth daily  -     nitroglycerin (NITROSTAT) 0.4 mg SL tablet; Place 1 tablet under the tongue every 5 (five) minutes as needed          Subjective:      Patient ID: Tony March is a 75 y.o. female. Pt here for yearly. S/p vaginal hysterectomy and sacrospinous ligament fixation. She has no GYN complaints. DEXA done in August showed osteoporosis in the femoral neck, osteopenia in the lumbar spine and hip. She is on Fosamax. This is managed by her family doctor  Normal 3D mammogram last November showed scattered fibroglandular densities. The following portions of the patient's history were reviewed and updated as appropriate: allergies, current medications, past family history, past medical history, past social history, past surgical history, and problem list.    Review of Systems   Constitutional: Negative. Gastrointestinal: Negative. Genitourinary: Negative. Objective: There were no vitals taken for this visit. Physical Exam  Vitals reviewed. Constitutional:       Appearance: Normal appearance. She is well-developed. Neck:      Thyroid: No thyromegaly. Trachea: No tracheal deviation. Cardiovascular:      Rate and Rhythm: Normal rate and regular rhythm. Pulmonary:      Effort: Pulmonary effort is normal.      Breath sounds: Normal breath sounds. Chest:   Breasts:     Breasts are symmetrical.      Right: No inverted nipple, mass, nipple discharge, skin change or tenderness. Left: No inverted nipple, mass, nipple discharge, skin change or tenderness. Abdominal:      General: There is no distension. Palpations: Abdomen is soft. There is no mass. Tenderness: There is no abdominal tenderness. Genitourinary:     Labia:         Right: No rash, tenderness, lesion or injury. Left: No rash, tenderness, lesion or injury. Vagina: Normal.      Uterus: Absent. Adnexa:         Right: No mass, tenderness or fullness. Left: No mass, tenderness or fullness. Rectum: Normal.      Comments: Vaginal cuff is normal  Neurological:      Mental Status: She is alert.

## (undated) DEVICE — NEEDLE HYPO 22G X 1-1/2 IN

## (undated) DEVICE — SCD SEQUENTIAL COMPRESSION COMFORT SLEEVE MEDIUM KNEE LENGTH: Brand: KENDALL SCD

## (undated) DEVICE — BETHLEHEM UNIVERSAL MINOR VAG: Brand: CARDINAL HEALTH

## (undated) DEVICE — PENCIL ELECTROSURG E-Z CLEAN -0035H

## (undated) DEVICE — GLOVE PI ULTRA TOUCH SZ.6.5

## (undated) DEVICE — PREMIUM DRY TRAY LF: Brand: MEDLINE INDUSTRIES, INC.

## (undated) DEVICE — IV EXTENSION TUBING 33 IN

## (undated) DEVICE — SYRINGE 20ML LL

## (undated) DEVICE — INTENDED FOR TISSUE SEPARATION, AND OTHER PROCEDURES THAT REQUIRE A SHARP SURGICAL BLADE TO PUNCTURE OR CUT.: Brand: BARD-PARKER SAFETY BLADES SIZE 15, STERILE

## (undated) DEVICE — X-RAY DETECTABLE SPONGES,16 PLY: Brand: VISTEC

## (undated) DEVICE — INTENT TO BE USED WITH SUTURE MATERIAL FOR TISSUE CLOSURE: Brand: RICHARD-ALLAN® NEEDLE 1/2 CIRCLE TAPER

## (undated) DEVICE — CURITY PLAIN PACKING STRIP: Brand: CURITY

## (undated) DEVICE — GLOVE INDICATOR PI UNDERGLOVE SZ 7 BLUE

## (undated) DEVICE — SUTURE CAPTURING DEVICE: Brand: CAPIO SLIM

## (undated) DEVICE — MEDI-VAC YANKAUER SUCTION HANDLE W/BULBOUS AND CONTROL VENT: Brand: CARDINAL HEALTH

## (undated) DEVICE — PACKING VAGINAL 2 IN

## (undated) DEVICE — POOLE SUCTION HANDLE: Brand: CARDINAL HEALTH

## (undated) DEVICE — PVC URETHRAL CATHETER: Brand: DOVER

## (undated) DEVICE — SUT VICRYL 0 CT-1 36 IN J946H

## (undated) DEVICE — DRAPE EQUIPMENT RF WAND

## (undated) DEVICE — SUT ABSORBABLE MONODEK 0 1/2 CIRCLE TC 48IN 833137

## (undated) DEVICE — 2000CC GUARDIAN II: Brand: GUARDIAN

## (undated) DEVICE — SYRINGE 50ML LL

## (undated) DEVICE — SYRINGE 10ML LL

## (undated) DEVICE — SMOKE EVAC BOVIE PENCIL BUTTON

## (undated) DEVICE — GLOVE PI ULTRA TOUCH SZ.7.0

## (undated) DEVICE — TUBING SUCTION 5MM X 12 FT

## (undated) DEVICE — NEEDLE SPINAL 22G X 3.5IN  QUINCKE

## (undated) DEVICE — TRAY FOLEY 16FR URIMETER SILICONE SURESTEP

## (undated) DEVICE — NEEDLE COUNTER LG W/RULER

## (undated) DEVICE — SUT VICRYL 0 CT-1 CR/8 27 IN JJ41G

## (undated) DEVICE — GLOVE PI ULTRA TOUCH SZ.7.5